# Patient Record
Sex: MALE | Race: WHITE
[De-identification: names, ages, dates, MRNs, and addresses within clinical notes are randomized per-mention and may not be internally consistent; named-entity substitution may affect disease eponyms.]

---

## 2019-11-04 ENCOUNTER — HOSPITAL ENCOUNTER (INPATIENT)
Dept: HOSPITAL 74 - YASAS | Age: 54
LOS: 4 days | Discharge: HOME | DRG: 773 | End: 2019-11-08
Attending: ALLERGY & IMMUNOLOGY | Admitting: ALLERGY & IMMUNOLOGY
Payer: COMMERCIAL

## 2019-11-04 VITALS — BODY MASS INDEX: 17.9 KG/M2

## 2019-11-04 DIAGNOSIS — F19.20: ICD-10-CM

## 2019-11-04 DIAGNOSIS — F10.230: ICD-10-CM

## 2019-11-04 DIAGNOSIS — F43.10: ICD-10-CM

## 2019-11-04 DIAGNOSIS — J20.9: ICD-10-CM

## 2019-11-04 DIAGNOSIS — F32.9: ICD-10-CM

## 2019-11-04 DIAGNOSIS — F19.24: ICD-10-CM

## 2019-11-04 DIAGNOSIS — G89.29: ICD-10-CM

## 2019-11-04 DIAGNOSIS — F17.210: ICD-10-CM

## 2019-11-04 DIAGNOSIS — J45.909: ICD-10-CM

## 2019-11-04 DIAGNOSIS — Z91.5: ICD-10-CM

## 2019-11-04 DIAGNOSIS — M54.5: ICD-10-CM

## 2019-11-04 DIAGNOSIS — M25.562: ICD-10-CM

## 2019-11-04 DIAGNOSIS — F12.20: ICD-10-CM

## 2019-11-04 DIAGNOSIS — F11.23: Primary | ICD-10-CM

## 2019-11-04 DIAGNOSIS — F19.282: ICD-10-CM

## 2019-11-04 DIAGNOSIS — Z88.0: ICD-10-CM

## 2019-11-04 PROCEDURE — HZ2ZZZZ DETOXIFICATION SERVICES FOR SUBSTANCE ABUSE TREATMENT: ICD-10-PCS | Performed by: ALLERGY & IMMUNOLOGY

## 2019-11-04 RX ADMIN — ALBUTEROL SULFATE SCH AMP: 2.5 SOLUTION RESPIRATORY (INHALATION) at 21:30

## 2019-11-04 RX ADMIN — NICOTINE SCH MG: 21 PATCH TRANSDERMAL at 18:24

## 2019-11-04 RX ADMIN — GUAIFENESIN SCH ML: 100 SOLUTION ORAL at 23:39

## 2019-11-04 RX ADMIN — GUAIFENESIN SCH ML: 100 SOLUTION ORAL at 18:24

## 2019-11-04 RX ADMIN — Medication SCH MG: at 21:31

## 2019-11-04 NOTE — HP
COWS





- Scale


Resting Pulse: 2= -120


Sweatin=Flushed/Facial Moisture


Restless Observation: 3= Extraneous Movement


Pupil Size: 0= Normal to Room Light


Bone or Joint Aches: 1= Mild Discomfort


Runny Nose/ Eye Tearin= Nasal Congestion


GI Upset > 30mins: 2= Nausea/Diarrhea


Tremor Observation: 1= Tremor Felt, Not Seen


Yawning Observation: 0= None


Anxiety or Irritability: 1=Feels Anxious/Irritable


Goose Flesh Skin: 3=Piloerection


COWS Score: 16





CIWA Score


Nausea/Vomitin-No Nausea/No Vomiting


Muscle Tremors: 2


Anxiety: 3


Agitation: 3


Paroxysmal Sweats: 3


Orientation: 0-Oriented


Tacttile Disturbances: 0-None


Auditory Disturbances: 0-None


Visual Disturbances: 0-None


Headache: 0-None Present


CIWA-Ar Total Score: 11





- Admission Criteria


[facility] Guidelines: Admission for Medically Managed Detox: 


Requires at least one of the followin. CIWA greater than 12


2. Seizures within the past 24 hours


3. Delirium tremens within the past 24 hours


4. Hallucinations within the past 24 hours


5. Acute intervention needed for co  occurring medical disorder


6. Acute intervention needed for co  occurring psychiatric disorder


7. Severe withdrawal that cannot be handled at a lower level of care (continued


    vomiting, continued diarrhea, abnormal vital signs) requiring intravenous


    medication and/or fluids


8. Pregnancy








Admitting History and Physical





- Primary Care Physician


PCP: Unknown





- Admission


Chief Complaint: To detox from alcohol, heroine and K2


History of Present Illness: 








Pt is a 55 yo M with PMHx of knee, back pain, unable to walk,possible asthma, 

currently withy cough, has night sweats, PTSD, Anxiety, major depression off 

meds- over 1 year after consulting doctor (remeron, depakote, abilify), pt 

lives on streets.  lease  from a room


Has been unemployed for 6 months, pt slipped and fell in 2019 and fractured 

L knee s/p 2 surgeries but unable to ambulate. Is requesting a walker.


Intermittently uses seroquel to sleep from the street





Pt used alcohol and K2 this am


Marijuana daily


Heroine last use yesterday


Cigarettes 1PPD for 39 years


Pt reports daily heroine withdrawals with night sweats





Alcohol-12


Heroine- 7


K2-50 yrs





Has done detox , clean for 13 years- heroine and alcohol, detoxed, rehab 

then AA meetings


Went back when wife was killed in [place] after a kidnap and failed rape, 

and he restarted using- 5 months ago


Never seized, syncopized


Pt reports shaking/tremors daily, sweats, hot and cold flushes, nausea, loose 

stools x2 today





Surgeries: 2 L, knee surgeries, tumor L UE at 5months





History Source: Patient


Limitations to Obtaining History: No Limitations





- Past Medical History


Pulmonary: Yes: Asthma





- Past Surgical History


Past Surgical History: Yes: Arthrosocopy (L knee x2 post fall)


Additional Past Surgical History: 





DANIEL surgery for childhood mass at 5 months





- Smoking History


Smoking history: Current every day smoker


Have you smoked in the past 12 months: Yes


Aproximately how many cigarettes per day: 20





- Alcohol/Substance Use


Hx Alcohol Use: Yes


Number of Drinks Daily: 1 (1$2 bottle of vodka)


History of Substance Use: reports: Heroin (3 bags)





- Social History


Usual Living Arrangement: Yes: Other (On street)


Do you think of yourself as: Straight/Heterosexual


ADL: Independent


History of Recent Travel: No


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 

substance abuse counselling, stopped 5 months ago





Admission [facility]


Allergies/Adverse Reactions: 


 Allergies











Allergy/AdvReac Type Severity Reaction Status Date / Time


 


Penicillins Allergy   Verified 19 15:44











Exam Limitations: No Limitations





- Ebola screening


Have you traveled outside of the country in the last 21 days: No (N)


Have you had contact with anyone from an Ebola affected area: No


Do you have a fever: No





- Review of Systems


Constitutional: Chills, Loss of Appetite, Weight Stable


EENT: reports: Nose Congestion


Respiratory: reports: Cough, Shortness of Breath, SOB at Rest


Cardiac: reports: Palpitations


GI: reports: Diarrhea


: reports: No Symptoms Reported


Musculoskeletal: reports: Back Pain, Joint Stiffness


Neuro: reports: No Symptoms reported


Endocrine: reports: No Symptoms Reported


Hematology: reports: No Symptoms Reported


Psychiatric: reports: No Sypmtoms Reported





Patient History





- Patient Medical History


Hx Anemia: No


Hx Asthma: Yes


Hx Chronic Obstructive Pulmonary Disease (COPD): No


Hx Cancer: No


Hx Cardiac Disorders: No


Hx Congestive Heart Failure: No


Hx Hypertension: No


Hx Hypercholesterolemia: No


Hx Pacemaker: No


HX Cerebrovascular Accident: No


Hx Seizures: No


Hx Dementia: No


Hx Diabetes: No


Hx Gastrointestinal Disorders: No


Hx Liver Disease: No


Hx Genitourinary Disorders: No


Hx Sexually Transmitted Disorders: No


Hx Renal Disease (ESRD): No


Hx Thyroid Disease: No


Hx Human Immunodeficiency Virus (HIV): No ( months ago, negative, refuses a new 

test)


Hx Hepatitis C: No


Hx Depression: Yes (Major depression not on meds)


Hx Suicide Attempt: Yes (Swallowed pills 10 years ago)


Hx Bipolar Disorder: No


Hx Schizophrenia: No





- Patient Surgical History


Hx Neurologic Surgery: No


Hx Cataract Extraction: No


Hx Cardiac Surgery: No


Hx Lung Surgery: No


Hx Breast Surgery: No


Hx Breast Biopsy: No


Hx Abdominal Surgery: No


Hx Appendectomy: No


Hx Cholecystectomy: No


Hx Genitourinary Surgery: No


Hx  Section: No


Hx Orthopedic Surgery: Yes (L knee x2)


Hx Hysterectomy: No


Anesthesia Reaction: No





- PPD History


Previous Implant?: Yes


Documented Results: Negative w/proof


Implanted On Prior [facility] Admission?: No


PPD to be Administered?: No





- Reproductive History


Patient is a Female of Child Bearing Age (11 -55 yrs old): No





- Smoking Cessation


Smoking history: Current every day smoker


Have you smoked in the past 12 months: Yes


Aproximately how many cigarettes per day: 20


Hx Chewing Tobacco Use: No


Initiated information on smoking cessation: Yes


'Breaking Loose' booklet given: 19





- Substances abused


  ** Alcohol


Substance route: Oral


Frequency: Daily


Amount used: 4 nips of vodka


Age of first use: 10


Date of last use: 19





  ** Heroin


Substance route: Inhalation


Frequency: Daily


Amount used: 1 bag


Age of first use: 7


Date of last use: 19





  ** K2/Spice


Substance route: Smoking


Frequency: Daily


Amount used: $30


Age of first use: 49


Date of last use: 17





Admission Physical Exam [facility]





- Vital Signs


Vital Signs: 


 Vital Signs - 24 hr











  19





  15:48


 


Temperature 96.3 F L


 


Pulse Rate 105 H


 


Respiratory 18





Rate 


 


Blood Pressure 167/108 H














- Physical


General Appearance: Yes: Disheveled, Cachetic, Tremorous, Anxious


HEENTM: Yes: EOMI, DINA.  No: Scleral Ictenus R, Thrush


Respiratory: Yes: Crackles, Rhonchi, Wheezing


Neck: Yes: Supple


Breast: Yes: Breast Exam Deferred


Cardiology: Yes: Tachycardia


Genitourinary: Yes: Within Normal Limits


Back: No: CVA Tenderness


Extremities: Yes: Tremors


Neurological: Yes: Fully Oriented, Motor Strength 5/5.  No: EOM Palsy, Facial 

Droop


Integumentary: Yes: Within Normal Limits





- Diagnostic


(1) Acute bronchitis


Current Visit: Yes   Status: Acute   





(2) Asthma


Current Visit: Yes   Status: Acute   





(3) Nicotine dependence


Current Visit: Yes   Status: Acute   





(4) Heroin dependence


Current Visit: Yes   Status: Acute   





(5) Heroin withdrawal


Current Visit: Yes   Status: Acute   





(6) Alcohol abuse


Current Visit: Yes   Status: Acute   





(7) Alcohol withdrawal


Current Visit: Yes   Status: Acute   





(8) Synthetic cannabinoid abuse


Current Visit: Yes   Status: Acute   





Cleared for Admission S





- Detox or Rehab


[facility] Level of Care: Medically Managed


Detox Regimen/Protocol: Clonidine/Librium, Librium


Claeared for Rehab Admission: No





Screened but not Admitted





- Documentation of Visit


Screened but not Admitted: No


Left Prior to Completion of Assessment: No


Insurance Authorization Denied: No


Patient Does Not Meet Criteria for Admission: No


Alternative Treatment/Shelter Info Provided: No





Breathalyzer





- Breathalyzer


Breathalyzer: 0





Vital Signs





- Vital Signs


Vital signs refused: No


Temperature: 96.3 F


Temperature source: Oral


Pulse Rate: 105


Respiratory Rate: 18


Blood Pressure: 167/108


Blood Pressure position: Sitting





- Height


Height: 1.6 m





- Weight


Weight: 45.813 kg





- BMI


Body Mass Index (BMI): 17.9





- Bowel Function


Bowel Movement: Yes





POC Urine pregnancy test





- Control


Pregnancy test control: No





Urine Drug Screen





- Control


Is test valid?: Yes





- Results


Urine drug screen results: BZO-Benzodiazepines





Inpatient Rehab Admission





- Rehab Decision to Admit


Inpatient rehab admission?: No

## 2019-11-05 LAB
ALBUMIN SERPL-MCNC: 3.5 G/DL (ref 3.4–5)
ALP SERPL-CCNC: 72 U/L (ref 45–117)
ALT SERPL-CCNC: 36 U/L (ref 13–61)
ANION GAP SERPL CALC-SCNC: 6 MMOL/L (ref 8–16)
APPEARANCE UR: CLEAR
AST SERPL-CCNC: 30 U/L (ref 15–37)
BACTERIA # UR AUTO: 2.9 /HPF
BILIRUB SERPL-MCNC: 0.4 MG/DL (ref 0.2–1)
BILIRUB UR STRIP.AUTO-MCNC: NEGATIVE MG/DL
BUN SERPL-MCNC: 12.9 MG/DL (ref 7–18)
CALCIUM SERPL-MCNC: 9.4 MG/DL (ref 8.5–10.1)
CASTS URNS QL MICRO: 4 /LPF (ref 0–8)
CHLORIDE SERPL-SCNC: 108 MMOL/L (ref 98–107)
CO2 SERPL-SCNC: 27 MMOL/L (ref 21–32)
COLOR UR: YELLOW
CREAT SERPL-MCNC: 0.9 MG/DL (ref 0.55–1.3)
DEPRECATED RDW RBC AUTO: 14.3 % (ref 11.9–15.9)
EPITH CASTS URNS QL MICRO: 1.2 /HPF
GLUCOSE SERPL-MCNC: 99 MG/DL (ref 74–106)
HCT VFR BLD CALC: 43.9 % (ref 35.4–49)
HGB BLD-MCNC: 14.9 GM/DL (ref 11.7–16.9)
KETONES UR QL STRIP: NEGATIVE
LEUKOCYTE ESTERASE UR QL STRIP.AUTO: NEGATIVE
MCH RBC QN AUTO: 31.4 PG (ref 25.7–33.7)
MCHC RBC AUTO-ENTMCNC: 33.9 G/DL (ref 32–35.9)
MCV RBC: 92.5 FL (ref 80–96)
NITRITE UR QL STRIP: NEGATIVE
PH UR: 5 [PH] (ref 5–8)
PLATELET # BLD AUTO: 247 K/MM3 (ref 134–434)
PMV BLD: 7.8 FL (ref 7.5–11.1)
POTASSIUM SERPLBLD-SCNC: 3.7 MMOL/L (ref 3.5–5.1)
PROT SERPL-MCNC: 6.9 G/DL (ref 6.4–8.2)
PROT UR QL STRIP: (no result)
PROT UR QL STRIP: NEGATIVE
RBC # BLD AUTO: 3 /HPF (ref 0–4)
RBC # BLD AUTO: 4.75 M/MM3 (ref 4–5.6)
SODIUM SERPL-SCNC: 141 MMOL/L (ref 136–145)
SP GR UR: 1.02 (ref 1.01–1.03)
UROBILINOGEN UR STRIP-MCNC: 0.2 MG/DL (ref 0.2–1)
WBC # BLD AUTO: 15.7 K/MM3 (ref 4–10)
WBC # UR AUTO: 4 /HPF (ref 0–5)

## 2019-11-05 RX ADMIN — GUAIFENESIN SCH ML: 100 SOLUTION ORAL at 19:00

## 2019-11-05 RX ADMIN — PREDNISONE SCH MG: 20 TABLET ORAL at 18:00

## 2019-11-05 RX ADMIN — ALBUTEROL SULFATE SCH: 2.5 SOLUTION RESPIRATORY (INHALATION) at 10:49

## 2019-11-05 RX ADMIN — GUAIFENESIN SCH: 100 SOLUTION ORAL at 23:54

## 2019-11-05 RX ADMIN — ALBUTEROL SULFATE SCH: 2.5 SOLUTION RESPIRATORY (INHALATION) at 18:01

## 2019-11-05 RX ADMIN — Medication SCH MG: at 21:29

## 2019-11-05 RX ADMIN — GUAIFENESIN SCH ML: 100 SOLUTION ORAL at 10:48

## 2019-11-05 RX ADMIN — NICOTINE SCH: 21 PATCH TRANSDERMAL at 10:49

## 2019-11-05 RX ADMIN — ALBUTEROL SULFATE SCH: 2.5 SOLUTION RESPIRATORY (INHALATION) at 15:19

## 2019-11-05 RX ADMIN — GUAIFENESIN SCH: 100 SOLUTION ORAL at 06:41

## 2019-11-05 RX ADMIN — ALBUTEROL SULFATE SCH: 2.5 SOLUTION RESPIRATORY (INHALATION) at 20:15

## 2019-11-05 RX ADMIN — Medication SCH TAB: at 10:49

## 2019-11-05 NOTE — PN
S CIWA





- CIWA Score


Nausea/Vomitin


Muscle Tremors: 2


Anxiety: 2


Agitation: 2


Paroxysmal Sweats: 1-Minimal Palms Moist


Orientation: 0-Oriented


Tacttile Disturbances: 1-Very Mild Itch/Numbness


Auditory Disturbances: 0-None


Visual Disturbances: 0-None


Headache: 1-Very Mild


CIWA-Ar Total Score: 11





B[facility] Progress Note (SOAP)


Subjective: 





alert,irritable,anxious,interrupted sleep,pain in the body,coughing


Objective: 





19 13:20


 Vital Signs











Temperature  97.5 F L  19 09:28


 


Pulse Rate  106 H  19 09:28


 


Respiratory Rate  18   19 09:28


 


Blood Pressure  118/70   19 09:28


 


O2 Sat by Pulse Oximetry (%)      








 Laboratory Last Values











WBC  15.7 K/mm3 (4.0-10.0)  H  19  08:00    


 


RBC  4.75 M/mm3 (4.00-5.60)   19  08:00    


 


Hgb  14.9 GM/dL (11.7-16.9)   19  08:00    


 


Hct  43.9 % (35.4-49)   19  08:00    


 


MCV  92.5 fl (80-96)   19  08:00    


 


MCH  31.4 pg (25.7-33.7)   19  08:00    


 


MCHC  33.9 g/dl (32.0-35.9)   19  08:00    


 


RDW  14.3 % (11.9-15.9)   19  08:00    


 


Plt Count  247 K/MM3 (134-434)   19  08:00    


 


MPV  7.8 fl (7.5-11.1)   19  08:00    


 


Sodium  141 mmol/L (136-145)   19  08:00    


 


Potassium  3.7 mmol/L (3.5-5.1)   19  08:00    


 


Chloride  108 mmol/L ()  H  19  08:00    


 


Carbon Dioxide  27 mmol/L (21-32)   19  08:00    


 


Anion Gap  6 MMOL/L (8-16)  L  19  08:00    


 


BUN  12.9 mg/dL (7-18)   19  08:00    


 


Creatinine  0.9 mg/dL (0.55-1.3)   19  08:00    


 


Est GFR (CKD-EPI)AfAm  110.27   19  08:00    


 


Est GFR (CKD-EPI)NonAf  95.14   19  08:00    


 


Random Glucose  99 mg/dL ()   19  08:00    


 


Calcium  9.4 mg/dL (8.5-10.1)   19  08:00    


 


Total Bilirubin  0.4 mg/dL (0.2-1)   19  08:00    


 


AST  30 U/L (15-37)   19  08:00    


 


ALT  36 U/L (13-61)   19  08:00    


 


Alkaline Phosphatase  72 U/L ()   19  08:00    


 


Total Protein  6.9 g/dl (6.4-8.2)   19  08:00    


 


Albumin  3.5 g/dl (3.4-5.0)   19  08:00    


 


Urine Color  Yellow   19  08:00    


 


Urine Appearance  Clear   19  08:00    


 


Urine pH  5.0  (5.0-8.0)   19  08:00    


 


Ur Specific Gravity  1.020  (1.010-1.035)   19  08:00    


 


Urine Protein  1+  (NEGATIVE)  H  19  08:00    


 


Urine Glucose (UA)  Negative  (NEGATIVE)   19  08:00    


 


Urine Ketones  Negative  (NEGATIVE)   19  08:00    


 


Urine Blood  Trace  (NEGATIVE)   19  08:00    


 


Urine Nitrite  Negative  (NEGATIVE)   19  08:00    


 


Urine Bilirubin  Negative  (NEGATIVE)   19  08:00    


 


Urine Urobilinogen  0.2 mg/dL (0.2-1.0)   19  08:00    


 


Ur Leukocyte Esterase  Negative  (NEGATIVE)   19  08:00    


 


Urine WBC (Auto)  4 /hpf (0-5)   19  08:00    


 


Urine RBC (Auto)  3 /hpf (0-4)   19  08:00    


 


Urine Casts (Auto)  4 /lpf (0-8)   19  08:00    


 


U Epithel Cells (Auto)  1.2 /HPF (0-5/HPF)   19  08:00    


 


Urine Bacteria (Auto)  2.9 /hpf (NEGATIVE)   19  08:00    











19 13:21


rpr pending


Assessment: 





19 13:22


withdrawal symptom


Plan: 





continue detox librium regimens,zithromax 500 mgs po now then 250 mgs po daily 

for 3 days for bronchitis,


albuterol in haler,on pred nosone 20 mg po daily

## 2019-11-05 NOTE — PN
Teaching Attending Note


Name of Resident: La Evans





ATTENDING PHYSICIAN STATEMENT





I saw and evaluated the patient.


I reviewed the resident's note and discussed the case with the resident.


I agree with the resident's findings and plan as documented.








SUBJECTIVE:


Agree with resident's subjective findings.





OBJECTIVE:


Agree with resident's objective findings.





ASSESSMENT AND PLAN:


Agree with admission criteria and plans.

## 2019-11-05 NOTE — CONSULT
[facility] Psychiatric Consult





- Data


Date of interview: 11/05/19


Admission source: [facility]


Identifying data: Mr Galvin is a 54 years old   male, father of 

2 children, homeless seeking detox treatment for alcohol, opioid, cannabis and 

k2


Substance Abuse History: Reports history of alcohol, heroin, marijuana and k2 

use. Refer to addiction counselor's summary for further information


Medical History: Significant for  knee/back pain and history of orthosurgery 

for fracture left knee  due to a fall in February 2019. Smokes cigarettes 1 ppd


Psychiatric History: Reports that his first psychiatric contact occured in 1998 

while in a rehab program at [facility]. He was diagnosed with PTSD and 

started on psychotropic medications. Over the years, he has been tried on 

several medications including Remeron, Abilify, Depakote, Zoloft, Alprazolam 

etc. Told writer that he last took medication 2 years ago. Reports that his 

adherence to [employer] care and medications were abysmal. Reports one previous 

suicidal attempt 10 years ago by overdose on pills. At present, reports feeling 

"melancholic" and sleeping poorly


Physical/Sexual Abuse/Trauma History: Denies history of abuse as a child and DV 

relationship as adult. Reports serving in the [employer] from 1985 to 1987. 

Reports that he was discharged  honorably


Additional Comment: Reports history of a few previous arrests including 2 

felony convictions on charges of drug sale. Told writer that he has an open 

case on charges of possession of k2





Mental Status Exam





- Mental Status Exam


Alert and Oriented to: Time, Place, Person


Cognitive Function: Fair


Patient Appearance: Unkempt


Mood: Depressed


Patient Behavior: Cooperative


Speech Pattern: Clear


Voice Loudness: Normal


Thought Process: Intact, Goal Oriented


Hallucinations: Denies


Suicidal Ideation: Denies


Homicidal Ideation: Denies


Insight/Judgement: Poor


Sleep: Poorly


Appetite: Fair


Muscle strength/Tone: Normal


Gait/Station: Normal





Psychiatric Findings





- Problem List (Axis 1, 2,3)


(1) PTSD (post-traumatic stress disorder)


Current Visit: Yes   Status: Chronic   





(2) Substance induced mood disorder


Current Visit: Yes   Status: Acute   





(3) Substance-induced sleep disorder


Current Visit: Yes   Status: Acute   





(4) Alcohol dependence, uncomplicated


Current Visit: Yes   Status: Acute   





(5) Opioid dependence


Current Visit: Yes   Status: Acute   





(6) Cannabis dependence


Current Visit: Yes   Status: Acute   





(7) Nicotine dependence


Current Visit: Yes   Status: Chronic   





(8) Acute bronchitis


Current Visit: Yes   Status: Acute   





(9) Knee pain, left


Current Visit: Yes   Status: Chronic   





(10) Back pain


Current Visit: Yes   Status: Chronic   





- Initial Treatment Plan


Initial Treatment Plan: 1) Start Melatonin 10 mg po HS prn for insomnia.  2) 

Continue inpatient detoxification

## 2019-11-05 NOTE — EKG
Test Reason : 

Blood Pressure : ***/*** mmHG

Vent. Rate : 101 BPM     Atrial Rate : 101 BPM

   P-R Int : 150 ms          QRS Dur : 096 ms

    QT Int : 372 ms       P-R-T Axes : 097 125 121 degrees

   QTc Int : 482 ms

 

*** SUSPECT ARM LEAD REVERSAL, INTERPRETATION ASSUMES NO REVERSAL

SINUS TACHYCARDIA

BIATRIAL ENLARGEMENT

LATERAL INFARCT , AGE UNDETERMINED

ABNORMAL ECG

NO PREVIOUS ECGS AVAILABLE

Confirmed by MD Eduardo, Guicho ([id number]) on 11/5/2019 9:14:21 AM

 

Referred By:             Confirmed By:Guicho Clark MD

## 2019-11-06 RX ADMIN — GUAIFENESIN SCH: 100 SOLUTION ORAL at 12:13

## 2019-11-06 RX ADMIN — Medication SCH MG: at 22:42

## 2019-11-06 RX ADMIN — ALBUTEROL SULFATE SCH: 2.5 SOLUTION RESPIRATORY (INHALATION) at 16:43

## 2019-11-06 RX ADMIN — ALBUTEROL SULFATE SCH: 2.5 SOLUTION RESPIRATORY (INHALATION) at 12:46

## 2019-11-06 RX ADMIN — NICOTINE SCH: 21 PATCH TRANSDERMAL at 10:05

## 2019-11-06 RX ADMIN — Medication SCH TAB: at 10:03

## 2019-11-06 RX ADMIN — PREDNISONE SCH MG: 20 TABLET ORAL at 10:04

## 2019-11-06 RX ADMIN — ALBUTEROL SULFATE SCH: 2.5 SOLUTION RESPIRATORY (INHALATION) at 08:14

## 2019-11-06 RX ADMIN — AZITHROMYCIN SCH MG: 250 TABLET, FILM COATED ORAL at 10:04

## 2019-11-06 RX ADMIN — GUAIFENESIN SCH ML: 100 SOLUTION ORAL at 06:08

## 2019-11-06 RX ADMIN — ALBUTEROL SULFATE SCH: 2.5 SOLUTION RESPIRATORY (INHALATION) at 20:43

## 2019-11-06 NOTE — EKG
Test Reason : 

Blood Pressure : ***/*** mmHG

Vent. Rate : 101 BPM     Atrial Rate : 101 BPM

   P-R Int : 114 ms          QRS Dur : 098 ms

    QT Int : 350 ms       P-R-T Axes : 080 045 042 degrees

   QTc Int : 453 ms

 

SINUS TACHYCARDIA

BIATRIAL ENLARGEMENT

PULMONARY DISEASE PATTERN

NONSPECIFIC ST ABNORMALITY

ABNORMAL ECG

WHEN COMPARED WITH ECG OF 04-NOV-2019 18:59,

QRS AXIS SHIFTED LEFT

CRITERIA FOR LATERAL INFARCT ARE NO LONGER PRESENT

Confirmed by OMAR JEFFERY, ELIA ([id number]) on 11/6/2019 10:39:24 AM

 

Referred By:             Confirmed By:ELIA NELSON MD

## 2019-11-06 NOTE — PN
[facility] Progress Note


Note: 


Pt. assessed on the unit s/p unwitnessed fall. Pt. reported he fell asleep on a 

chair after watching TV.  He denied hitting his head or sustaining any 

injuries.  Pt.  is alert and oriented x 3; self-directing. Full ROM noted. He 

denied pain or discomfort; no bruises noted. 








Afterwards, writer reviewed the video camera of the client falling with security

; pt. seemingly did not hit his head.    





Plan: fall protocol 2 initiated. 


Reinforced fall precautions

## 2019-11-06 NOTE — PN
S CIWA





- CIWA Score


Nausea/Vomitin-Mild Nausea/No Vomiting


Muscle Tremors: 1-None Visible, but Felt


Anxiety: 2


Agitation: 2


Paroxysmal Sweats: 1-Minimal Palms Moist


Orientation: 0-Oriented


Tacttile Disturbances: 1-Very Mild Itch/Numbness


Auditory Disturbances: 0-None


Visual Disturbances: 0-None


Headache: 1-Very Mild


CIWA-Ar Total Score: 9





B[facility] Progress Note (SOAP)


Subjective: 





alert,irritable,anxious,interrupted sleep,pain in the body,


Objective: 





19 14:22


 Vital Signs











Temperature  97.3 F L  19 13:22


 


Pulse Rate  97 H  19 13:22


 


Respiratory Rate  18   19 13:22


 


Blood Pressure  122/92   19 13:22


 


O2 Sat by Pulse Oximetry (%)      








 Laboratory Last Values











WBC  15.7 K/mm3 (4.0-10.0)  H  19  08:00    


 


RBC  4.75 M/mm3 (4.00-5.60)   19  08:00    


 


Hgb  14.9 GM/dL (11.7-16.9)   19  08:00    


 


Hct  43.9 % (35.4-49)   19  08:00    


 


MCV  92.5 fl (80-96)   19  08:00    


 


MCH  31.4 pg (25.7-33.7)   19  08:00    


 


MCHC  33.9 g/dl (32.0-35.9)   19  08:00    


 


RDW  14.3 % (11.9-15.9)   19  08:00    


 


Plt Count  247 K/MM3 (134-434)   19  08:00    


 


MPV  7.8 fl (7.5-11.1)   19  08:00    


 


Sodium  141 mmol/L (136-145)   19  08:00    


 


Potassium  3.7 mmol/L (3.5-5.1)   19  08:00    


 


Chloride  108 mmol/L ()  H  19  08:00    


 


Carbon Dioxide  27 mmol/L (21-32)   19  08:00    


 


Anion Gap  6 MMOL/L (8-16)  L  19  08:00    


 


BUN  12.9 mg/dL (7-18)   19  08:00    


 


Creatinine  0.9 mg/dL (0.55-1.3)   19  08:00    


 


Est GFR (CKD-EPI)AfAm  110.27   19  08:00    


 


Est GFR (CKD-EPI)NonAf  95.14   19  08:00    


 


Random Glucose  99 mg/dL ()   19  08:00    


 


Calcium  9.4 mg/dL (8.5-10.1)   19  08:00    


 


Total Bilirubin  0.4 mg/dL (0.2-1)   19  08:00    


 


AST  30 U/L (15-37)   19  08:00    


 


ALT  36 U/L (13-61)   19  08:00    


 


Alkaline Phosphatase  72 U/L ()   19  08:00    


 


Total Protein  6.9 g/dl (6.4-8.2)   19  08:00    


 


Albumin  3.5 g/dl (3.4-5.0)   19  08:00    


 


Urine Color  Yellow   19  08:00    


 


Urine Appearance  Clear   19  08:00    


 


Urine pH  5.0  (5.0-8.0)   19  08:00    


 


Ur Specific Gravity  1.020  (1.010-1.035)   19  08:00    


 


Urine Protein  1+  (NEGATIVE)  H  19  08:00    


 


Urine Glucose (UA)  Negative  (NEGATIVE)   19  08:00    


 


Urine Ketones  Negative  (NEGATIVE)   19  08:00    


 


Urine Blood  Trace  (NEGATIVE)   19  08:00    


 


Urine Nitrite  Negative  (NEGATIVE)   19  08:00    


 


Urine Bilirubin  Negative  (NEGATIVE)   19  08:00    


 


Urine Urobilinogen  0.2 mg/dL (0.2-1.0)   19  08:00    


 


Ur Leukocyte Esterase  Negative  (NEGATIVE)   19  08:00    


 


Urine WBC (Auto)  4 /hpf (0-5)   19  08:00    


 


Urine RBC (Auto)  3 /hpf (0-4)   19  08:00    


 


Urine Casts (Auto)  4 /lpf (0-8)   19  08:00    


 


U Epithel Cells (Auto)  1.2 /HPF (0-5/HPF)   19  08:00    


 


Urine Bacteria (Auto)  2.9 /hpf (NEGATIVE)   19  08:00    


 


RPR Titer  Nonreactive  (NONREACTIVE)   19  08:00    











Assessment: 





19 14:22


withdrawal symptom


Plan: 





continue detox lirium regimen,chest x ray today,on zithromax for acute 

bronchitis

## 2019-11-07 RX ADMIN — ALBUTEROL SULFATE SCH: 2.5 SOLUTION RESPIRATORY (INHALATION) at 12:17

## 2019-11-07 RX ADMIN — Medication SCH MG: at 22:26

## 2019-11-07 RX ADMIN — PREDNISONE SCH MG: 20 TABLET ORAL at 10:48

## 2019-11-07 RX ADMIN — ALBUTEROL SULFATE SCH AMP: 2.5 SOLUTION RESPIRATORY (INHALATION) at 08:29

## 2019-11-07 RX ADMIN — METHOCARBAMOL PRN MG: 500 TABLET ORAL at 12:18

## 2019-11-07 RX ADMIN — AZITHROMYCIN SCH MG: 250 TABLET, FILM COATED ORAL at 10:48

## 2019-11-07 RX ADMIN — ALBUTEROL SULFATE SCH: 2.5 SOLUTION RESPIRATORY (INHALATION) at 16:01

## 2019-11-07 RX ADMIN — GUAIFENESIN SCH MG: 600 TABLET, EXTENDED RELEASE ORAL at 12:18

## 2019-11-07 RX ADMIN — ALBUTEROL SULFATE SCH: 2.5 SOLUTION RESPIRATORY (INHALATION) at 20:52

## 2019-11-07 RX ADMIN — Medication SCH TAB: at 10:47

## 2019-11-07 RX ADMIN — GUAIFENESIN SCH MG: 600 TABLET, EXTENDED RELEASE ORAL at 21:00

## 2019-11-07 RX ADMIN — NICOTINE SCH: 21 PATCH TRANSDERMAL at 10:49

## 2019-11-07 NOTE — PN
S CIWA





- CIWA Score


Nausea/Vomitin-No Nausea/No Vomiting


Muscle Tremors: 2


Anxiety: 1-Mildly Anxious


Agitation: 0-Normal Activity


Paroxysmal Sweats: No Perspiration


Orientation: 0-Oriented


Tacttile Disturbances: 0-None


Auditory Disturbances: 0-None


Visual Disturbances: 0-None


Headache: 0-None Present


CIWA-Ar Total Score: 3





[facility] Progress Note (SOAP)


Subjective: 





knee pain


sweats





Objective: 





19 11:32


 Vital Signs











Temperature  97.0 F L  19 10:25


 


Pulse Rate  91 H  19 10:25


 


Respiratory Rate  18   19 10:25


 


Blood Pressure  119/67   19 10:25


 


O2 Sat by Pulse Oximetry (%)      








aaox3


ambulating


no acute distress


Assessment: 





19 11:32


mild withdrawals


Plan: 





continue detox


cane ordered for ambulating


motrin 800mg tid prn


tyelnol prn


roboxin prn


lidocaine patch 


d/c in am

## 2019-11-08 VITALS — TEMPERATURE: 97.5 F | DIASTOLIC BLOOD PRESSURE: 70 MMHG | SYSTOLIC BLOOD PRESSURE: 126 MMHG | HEART RATE: 92 BPM

## 2019-11-08 RX ADMIN — NICOTINE SCH: 21 PATCH TRANSDERMAL at 09:16

## 2019-11-08 RX ADMIN — Medication SCH TAB: at 09:15

## 2019-11-08 RX ADMIN — METHOCARBAMOL PRN MG: 500 TABLET ORAL at 00:52

## 2019-11-08 RX ADMIN — GUAIFENESIN SCH MG: 600 TABLET, EXTENDED RELEASE ORAL at 09:15

## 2019-11-08 RX ADMIN — AZITHROMYCIN SCH MG: 250 TABLET, FILM COATED ORAL at 09:16

## 2019-11-08 RX ADMIN — PREDNISONE SCH MG: 20 TABLET ORAL at 09:15

## 2019-11-08 NOTE — DS
[facility] Detox Discharge Summary


Admission Date: 


11/04/19





Discharge Date: 11/08/19





- History


Present History: Alcohol Dependence, Cannabis Dependence, Opioid Dependence





- Physical Exam Results


Vital Signs: 


 Vital Signs











Temperature  98.1 F   11/07/19 21:21


 


Pulse Rate  84   11/07/19 21:21


 


Respiratory Rate  18   11/07/19 21:21


 


Blood Pressure  114/82   11/07/19 21:21


 


O2 Sat by Pulse Oximetry (%)  91 L  11/07/19 08:59











Pertinent Admission Physical Exam Findings: 





pt arrived in withdrawals


 Vital Signs











Temperature  98.1 F   11/07/19 21:21


 


Pulse Rate  84   11/07/19 21:21


 


Respiratory Rate  18   11/07/19 21:21


 


Blood Pressure  114/82   11/07/19 21:21


 


O2 Sat by Pulse Oximetry (%)  91 L  11/07/19 08:59








 Laboratory Tests











  11/05/19 11/05/19 11/05/19





  08:00 08:00 08:00


 


WBC  15.7 H  


 


RBC  4.75  


 


Hgb  14.9  


 


Hct  43.9  


 


MCV  92.5  


 


MCH  31.4  


 


MCHC  33.9  


 


RDW  14.3  


 


Plt Count  247  


 


MPV  7.8  


 


Sodium   141 


 


Potassium   3.7 


 


Chloride   108 H 


 


Carbon Dioxide   27 


 


Anion Gap   6 L 


 


BUN   12.9 


 


Creatinine   0.9 


 


Est GFR (CKD-EPI)AfAm   110.27 


 


Est GFR (CKD-EPI)NonAf   95.14 


 


Random Glucose   99 


 


Calcium   9.4 


 


Total Bilirubin   0.4 


 


AST   30 


 


ALT   36 


 


Alkaline Phosphatase   72 


 


Total Protein   6.9 


 


Albumin   3.5 


 


Urine Color   


 


Urine Appearance   


 


Urine pH   


 


Ur Specific Gravity   


 


Urine Protein   


 


Urine Glucose (UA)   


 


Urine Ketones   


 


Urine Blood   


 


Urine Nitrite   


 


Urine Bilirubin   


 


Urine Urobilinogen   


 


Ur Leukocyte Esterase   


 


Urine WBC (Auto)   


 


Urine RBC (Auto)   


 


Urine Casts (Auto)   


 


U Epithel Cells (Auto)   


 


Urine Bacteria (Auto)   


 


RPR Titer    Nonreactive














  11/05/19





  08:00


 


WBC 


 


RBC 


 


Hgb 


 


Hct 


 


MCV 


 


MCH 


 


MCHC 


 


RDW 


 


Plt Count 


 


MPV 


 


Sodium 


 


Potassium 


 


Chloride 


 


Carbon Dioxide 


 


Anion Gap 


 


BUN 


 


Creatinine 


 


Est GFR (CKD-EPI)AfAm 


 


Est GFR (CKD-EPI)NonAf 


 


Random Glucose 


 


Calcium 


 


Total Bilirubin 


 


AST 


 


ALT 


 


Alkaline Phosphatase 


 


Total Protein 


 


Albumin 


 


Urine Color  Yellow


 


Urine Appearance  Clear


 


Urine pH  5.0


 


Ur Specific Gravity  1.020


 


Urine Protein  1+ H


 


Urine Glucose (UA)  Negative


 


Urine Ketones  Negative


 


Urine Blood  Trace


 


Urine Nitrite  Negative


 


Urine Bilirubin  Negative


 


Urine Urobilinogen  0.2


 


Ur Leukocyte Esterase  Negative


 


Urine WBC (Auto)  4


 


Urine RBC (Auto)  3


 


Urine Casts (Auto)  4


 


U Epithel Cells (Auto)  1.2


 


Urine Bacteria (Auto)  2.9


 


RPR Titer 








today pt is aaox3


ambulating


no acute distress


no s/s of withdrawals





- Treatment


Hospital Course: Detox Protocol Followed, Detoxed Safely, Responded well, 

Discharged Condition Good, Rehab Referral Accepted


Patient has Accepted a Rehab Referral to: pt referred to inpatient rehab





- Medication


Discharge Medications: 


Ambulatory Orders





Albuterol Sulfate Inhaler - [Ventolin Hfa Inhaler -] 1 - 2 inh PO PRN PRN 11/04/ 19 


Prednisone [Deltasone] 20 mg PO DAILY 11/04/19 











- Diagnosis


(1) Acute bronchitis


Current Visit: Yes   Status: Acute   





(2) Alcohol dependence, uncomplicated


Current Visit: Yes   Status: Acute   





(3) Asthma


Current Visit: Yes   Status: Acute   


Qualifiers: 


   Asthma severity: mild   Asthma complication type: unspecified 





(4) Cannabis dependence


Current Visit: Yes   Status: Chronic   





(5) Heroin withdrawal


Current Visit: Yes   Status: Acute   





(6) Opioid dependence


Current Visit: Yes   Status: Chronic   


Qualifiers: 


   Substance use status: uncomplicated   Qualified Code(s): F11.20 - Opioid 

dependence, uncomplicated   





(7) Substance induced mood disorder


Current Visit: Yes   Status: Acute   





(8) Substance-induced sleep disorder


Current Visit: Yes   Status: Acute   





(9) Back pain


Current Visit: Yes   Status: Chronic   


Qualifiers: 


   Back pain location: low back pain 





(10) Knee pain, left


Current Visit: Yes   Status: Chronic   





(11) Nicotine dependence


Current Visit: Yes   Status: Chronic   


Qualifiers: 


   Nicotine product type: cigarettes   Substance use status: uncomplicated   

Qualified Code(s): F17.210 - Nicotine dependence, cigarettes, uncomplicated   





(12) PTSD (post-traumatic stress disorder)


Current Visit: Yes   Status: Chronic   





- AMA


Did Patient Leave Against Medical Advice: No

## 2019-12-11 ENCOUNTER — APPOINTMENT (OUTPATIENT)
Dept: PHYSICAL MEDICINE AND REHAB | Facility: CLINIC | Age: 54
End: 2019-12-11
Payer: MEDICAID

## 2019-12-11 VITALS
BODY MASS INDEX: 21.26 KG/M2 | WEIGHT: 120 LBS | HEIGHT: 63 IN | HEART RATE: 81 BPM | DIASTOLIC BLOOD PRESSURE: 78 MMHG | OXYGEN SATURATION: 96 % | SYSTOLIC BLOOD PRESSURE: 124 MMHG

## 2019-12-11 DIAGNOSIS — F19.90 OTHER PSYCHOACTIVE SUBSTANCE USE, UNSPECIFIED, UNCOMPLICATED: ICD-10-CM

## 2019-12-11 DIAGNOSIS — Z78.9 OTHER SPECIFIED HEALTH STATUS: ICD-10-CM

## 2019-12-11 DIAGNOSIS — Z98.890 OTHER SPECIFIED POSTPROCEDURAL STATES: ICD-10-CM

## 2019-12-11 PROBLEM — Z00.00 ENCOUNTER FOR PREVENTIVE HEALTH EXAMINATION: Status: ACTIVE | Noted: 2019-12-11

## 2019-12-11 PROCEDURE — 99203 OFFICE O/P NEW LOW 30 MIN: CPT

## 2019-12-11 RX ORDER — LIDOCAINE 5% 700 MG/1
PATCH TOPICAL
Refills: 0 | Status: ACTIVE | COMMUNITY

## 2019-12-11 RX ORDER — ESCITALOPRAM OXALATE 5 MG/1
TABLET, FILM COATED ORAL
Refills: 0 | Status: ACTIVE | COMMUNITY

## 2019-12-11 NOTE — ASSESSMENT
[FreeTextEntry1] : Impression:\par 1. S/P Knee Surgery\par \par Plan: Review of the history and physical examination the patient is status post knee surgery due to trauma, however brings no documentation or imaging for further detail or review. The knee is diffusely tender to touch and the patient is guarding ROM. The patient states he is only requesting a Rollator Walker and plans to follow up with his knee surgeon for further care. I recommended the patient continue with his HEP as prescribed by his physical therapist. He is currently using OTC Lidocaine patches for pain. I have provided the patient with a referral for a walker. The patient expressed verbal understanding and is in agreement with the plan of care. All of the patient's questions and concerns were addressed during today's visit.

## 2019-12-11 NOTE — PHYSICAL EXAM
[FreeTextEntry1] : GEN:  NAD, AAOx3.\par HEENT:  NCAT. EOMI. Anicteric sclerae, no discharge.\par PSYCH:  Normal mood and affect. Responds appropriately to commands.\par CV:  DP and PT pulses 2+ bilaterally, no edema.\par INSPECTION:  No effusion, discoloration. Well healed midline surgical scar. \par GAIT: (+) antalgic.\par Lumbosacral AROM: within normal limits.\par Hip and ankle PROM: Full and pain free.\par Knee AROM: Pain limited, lacking full Ext and Flex>90 due to guarding.\par PALPATION:  No effusion, warmth or crepitus. (+) Diffuse TTP throughout the Left knee. \par LYMPH:  No popliteal LAD or lymphedema.\par REFLEXES:  2+ symmetric bilateral knee and ankles.\par SENSATION:  Normal to light touch in the bilateral lower extremities.\par MOTOR:  5/5 in all key muscle groups of the bilateral lower limbs. No spasticity, tremor, atrophy or contractures noted.\par SPECIAL:  Deferred. Patient refused to participate due to pain. No joint laxity noted on varus/valgus stress or lachman.

## 2019-12-11 NOTE — HISTORY OF PRESENT ILLNESS
[FreeTextEntry1] : Mr. HARVINDER JOINER is a very pleasant 54 year male who comes in for initial evaluation of knee pain that has been ongoing for six months, the patient sustained trauma and underwent 2 reconstructive surgeries July/August 2019 followed by PT and has been in pain since. The patient brings no records or imaging with him today. Currently the pain is located in the left knee constant in nature and described as excruciating pain. The pain is rated as 10/10 during today's visit, and ranges from 9-10/10. The patient's symptoms are aggravated by coldness and alleviated by nothing. The patient denies any night pain, numbness/tingling, weakness, or bowel/bladder dysfunction. The patient has no other complaints at this time.

## 2019-12-11 NOTE — REVIEW OF SYSTEMS
[Patient Intake Form Reviewed] : Patient intake form was reviewed [FreeTextEntry1] : Constitutional: no chills, not feeling tired and no fever. \par Eyes: no discharge, no pain and no redness. \par ENT: no nasal discharge, no nosebleeds and no sore throat. \par Cardiovascular: no chest pain, no palpitations and the heart rate was not fast. \par Respiratory: no shortness of breath, no cough and no wheezing. \par Gastrointestinal: no abdominal pain, no diarrhea, no vomiting and no melena. \par Genitourinary: no pelvic pain, no dysuria, no abnormal urethral discharge and no frequency. \par Integumentary: no skin lesions and no change in a mole. \par Neurological: no dizziness, no fainting and no convulsions. \par Endocrine: no deepening of the voice and no hot flashes. \par Hematologic/Lymphatic: does not bleed easily, no swollen glands and no cervical lymphadenopathy. \par Musculoskeletal: as per HPI, otherwise denies additional joint pain, effusions, stiffness.\par All other review of systems are negative except as noted.\par

## 2021-04-26 ENCOUNTER — EMERGENCY (EMERGENCY)
Facility: HOSPITAL | Age: 56
LOS: 1 days | Discharge: LEFT BEFORE DISCHARGE | End: 2021-04-26
Admitting: EMERGENCY MEDICINE
Payer: SELF-PAY

## 2021-04-26 VITALS
SYSTOLIC BLOOD PRESSURE: 135 MMHG | OXYGEN SATURATION: 96 % | DIASTOLIC BLOOD PRESSURE: 94 MMHG | RESPIRATION RATE: 18 BRPM | HEART RATE: 87 BPM

## 2021-04-26 DIAGNOSIS — R55 SYNCOPE AND COLLAPSE: ICD-10-CM

## 2021-04-26 DIAGNOSIS — Z53.21 PROCEDURE AND TREATMENT NOT CARRIED OUT DUE TO PATIENT LEAVING PRIOR TO BEING SEEN BY HEALTH CARE PROVIDER: ICD-10-CM

## 2021-04-26 PROCEDURE — 99283 EMERGENCY DEPT VISIT LOW MDM: CPT

## 2021-04-26 PROCEDURE — L9991: CPT

## 2021-04-26 NOTE — ED ADULT TRIAGE NOTE - EXPLANATION OF PATIENT'S REASON FOR LEAVING
pt states he has a follow up appointment with his cardiologist tomorrow and he states he does not want to stay to be evaluated in the [facility] ED; pt ambulatory and A&Ox4

## 2021-04-26 NOTE — ED ADULT TRIAGE NOTE - CHIEF COMPLAINT QUOTE
pt reports hx of multiple syncopal episodes within last week including this AM; BGL found to be 62 with EMS , given D50 and rechecked with ; pt currently A&Ox4, NAD and requesting to leave, states he is aware of risks associated with leaving including life-threatening complications and not undergoing medical evaluation; pt verbalized understanding and still requesting to leave; refused to complete triage questions and assessment including BGL and EKG; PIV removed that had been placed by EMS

## 2022-01-07 ENCOUNTER — TRANSCRIPTION ENCOUNTER (OUTPATIENT)
Age: 57
End: 2022-01-07

## 2022-05-29 ENCOUNTER — EMERGENCY (EMERGENCY)
Facility: HOSPITAL | Age: 57
LOS: 1 days | Discharge: LEFT BEFORE DISCHARGE | End: 2022-05-29
Attending: EMERGENCY MEDICINE | Admitting: EMERGENCY MEDICINE
Payer: MEDICAID

## 2022-05-29 VITALS
OXYGEN SATURATION: 99 % | DIASTOLIC BLOOD PRESSURE: 79 MMHG | SYSTOLIC BLOOD PRESSURE: 107 MMHG | RESPIRATION RATE: 16 BRPM | HEART RATE: 106 BPM | TEMPERATURE: 98 F | WEIGHT: 119.93 LBS

## 2022-05-29 PROCEDURE — L9991: CPT

## 2022-05-29 RX ORDER — ONDANSETRON 8 MG/1
4 TABLET, FILM COATED ORAL ONCE
Refills: 0 | Status: DISCONTINUED | OUTPATIENT
Start: 2022-05-29 | End: 2022-06-02

## 2022-05-31 DIAGNOSIS — Z88.0 ALLERGY STATUS TO PENICILLIN: ICD-10-CM

## 2022-05-31 DIAGNOSIS — R11.0 NAUSEA: ICD-10-CM

## 2022-05-31 DIAGNOSIS — Z53.21 PROCEDURE AND TREATMENT NOT CARRIED OUT DUE TO PATIENT LEAVING PRIOR TO BEING SEEN BY HEALTH CARE PROVIDER: ICD-10-CM

## 2022-07-14 ENCOUNTER — EMERGENCY (EMERGENCY)
Facility: HOSPITAL | Age: 57
LOS: 1 days | Discharge: HOME | End: 2022-07-14
Attending: EMERGENCY MEDICINE | Admitting: EMERGENCY MEDICINE

## 2022-07-14 ENCOUNTER — INPATIENT (INPATIENT)
Facility: HOSPITAL | Age: 57
LOS: 3 days | Discharge: HOME | DRG: 812 | End: 2022-07-18
Attending: GENERAL ACUTE CARE HOSPITAL | Admitting: STUDENT IN AN ORGANIZED HEALTH CARE EDUCATION/TRAINING PROGRAM
Payer: MEDICAID

## 2022-07-14 VITALS
DIASTOLIC BLOOD PRESSURE: 60 MMHG | WEIGHT: 145.06 LBS | OXYGEN SATURATION: 100 % | SYSTOLIC BLOOD PRESSURE: 90 MMHG | RESPIRATION RATE: 17 BRPM | HEART RATE: 87 BPM | TEMPERATURE: 99 F

## 2022-07-14 VITALS
OXYGEN SATURATION: 97 % | SYSTOLIC BLOOD PRESSURE: 106 MMHG | RESPIRATION RATE: 20 BRPM | SYSTOLIC BLOOD PRESSURE: 106 MMHG | TEMPERATURE: 98 F | HEART RATE: 86 BPM | DIASTOLIC BLOOD PRESSURE: 68 MMHG | DIASTOLIC BLOOD PRESSURE: 68 MMHG | TEMPERATURE: 98 F | HEART RATE: 86 BPM

## 2022-07-14 DIAGNOSIS — D64.9 ANEMIA, UNSPECIFIED: ICD-10-CM

## 2022-07-14 DIAGNOSIS — F19.19 OTHER PSYCHOACTIVE SUBSTANCE ABUSE WITH UNSPECIFIED PSYCHOACTIVE SUBSTANCE-INDUCED DISORDER: ICD-10-CM

## 2022-07-14 DIAGNOSIS — R55 SYNCOPE AND COLLAPSE: ICD-10-CM

## 2022-07-14 DIAGNOSIS — Z88.0 ALLERGY STATUS TO PENICILLIN: ICD-10-CM

## 2022-07-14 DIAGNOSIS — F43.10 POST-TRAUMATIC STRESS DISORDER, UNSPECIFIED: ICD-10-CM

## 2022-07-14 DIAGNOSIS — Z20.822 CONTACT WITH AND (SUSPECTED) EXPOSURE TO COVID-19: ICD-10-CM

## 2022-07-14 LAB
ALBUMIN SERPL ELPH-MCNC: 3 G/DL — LOW (ref 3.4–5)
ALP SERPL-CCNC: 63 U/L — SIGNIFICANT CHANGE UP (ref 40–120)
ALT FLD-CCNC: 25 U/L — SIGNIFICANT CHANGE UP (ref 12–42)
AMPHET UR-MCNC: NEGATIVE — SIGNIFICANT CHANGE UP
ANION GAP SERPL CALC-SCNC: 5 MMOL/L — LOW (ref 9–16)
ANISOCYTOSIS BLD QL: SIGNIFICANT CHANGE UP
APPEARANCE UR: CLEAR — SIGNIFICANT CHANGE UP
AST SERPL-CCNC: 14 U/L — LOW (ref 15–37)
BARBITURATES UR SCN-MCNC: NEGATIVE — SIGNIFICANT CHANGE UP
BASOPHILS # BLD AUTO: 0.02 K/UL — SIGNIFICANT CHANGE UP (ref 0–0.2)
BASOPHILS NFR BLD AUTO: 0.2 % — SIGNIFICANT CHANGE UP (ref 0–2)
BENZODIAZ UR-MCNC: NEGATIVE — SIGNIFICANT CHANGE UP
BILIRUB SERPL-MCNC: 0.2 MG/DL — SIGNIFICANT CHANGE UP (ref 0.2–1.2)
BILIRUB UR-MCNC: NEGATIVE — SIGNIFICANT CHANGE UP
BUN SERPL-MCNC: 18 MG/DL — SIGNIFICANT CHANGE UP (ref 7–23)
CALCIUM SERPL-MCNC: 8.9 MG/DL — SIGNIFICANT CHANGE UP (ref 8.5–10.5)
CHLORIDE SERPL-SCNC: 107 MMOL/L — SIGNIFICANT CHANGE UP (ref 96–108)
CO2 SERPL-SCNC: 32 MMOL/L — HIGH (ref 22–31)
COCAINE METAB.OTHER UR-MCNC: NEGATIVE — SIGNIFICANT CHANGE UP
COLOR SPEC: YELLOW — SIGNIFICANT CHANGE UP
CREAT SERPL-MCNC: 0.87 MG/DL — SIGNIFICANT CHANGE UP (ref 0.5–1.3)
DIFF PNL FLD: NEGATIVE — SIGNIFICANT CHANGE UP
EGFR: 99 ML/MIN/1.73M2 — SIGNIFICANT CHANGE UP
EOSINOPHIL # BLD AUTO: 0.06 K/UL — SIGNIFICANT CHANGE UP (ref 0–0.5)
EOSINOPHIL NFR BLD AUTO: 0.7 % — SIGNIFICANT CHANGE UP (ref 0–6)
ETHANOL SERPL-MCNC: <3 MG/DL — SIGNIFICANT CHANGE UP
GLUCOSE SERPL-MCNC: 115 MG/DL — HIGH (ref 70–99)
GLUCOSE UR QL: NEGATIVE — SIGNIFICANT CHANGE UP
HCT VFR BLD CALC: 18.1 % — CRITICAL LOW (ref 39–50)
HGB BLD-MCNC: 5.6 G/DL — CRITICAL LOW (ref 13–17)
HYPOCHROMIA BLD QL: SIGNIFICANT CHANGE UP
IMM GRANULOCYTES NFR BLD AUTO: 0.4 % — SIGNIFICANT CHANGE UP (ref 0–1.5)
KETONES UR-MCNC: NEGATIVE — SIGNIFICANT CHANGE UP
LEUKOCYTE ESTERASE UR-ACNC: NEGATIVE — SIGNIFICANT CHANGE UP
LYMPHOCYTES # BLD AUTO: 1.27 K/UL — SIGNIFICANT CHANGE UP (ref 1–3.3)
LYMPHOCYTES # BLD AUTO: 14.2 % — SIGNIFICANT CHANGE UP (ref 13–44)
MACROCYTES BLD QL: SIGNIFICANT CHANGE UP
MAGNESIUM SERPL-MCNC: 2.4 MG/DL — SIGNIFICANT CHANGE UP (ref 1.6–2.6)
MANUAL SMEAR VERIFICATION: SIGNIFICANT CHANGE UP
MCHC RBC-ENTMCNC: 28.1 PG — SIGNIFICANT CHANGE UP (ref 27–34)
MCHC RBC-ENTMCNC: 30.9 GM/DL — LOW (ref 32–36)
MCV RBC AUTO: 91 FL — SIGNIFICANT CHANGE UP (ref 80–100)
METHADONE UR-MCNC: POSITIVE
MONOCYTES # BLD AUTO: 0.66 K/UL — SIGNIFICANT CHANGE UP (ref 0–0.9)
MONOCYTES NFR BLD AUTO: 7.4 % — SIGNIFICANT CHANGE UP (ref 2–14)
NEUTROPHILS # BLD AUTO: 6.9 K/UL — SIGNIFICANT CHANGE UP (ref 1.8–7.4)
NEUTROPHILS NFR BLD AUTO: 77.1 % — HIGH (ref 43–77)
NEUTS HYPERSEG # BLD: PRESENT — SIGNIFICANT CHANGE UP
NITRITE UR-MCNC: NEGATIVE — SIGNIFICANT CHANGE UP
NRBC # BLD: 0 /100 WBCS — SIGNIFICANT CHANGE UP (ref 0–0)
NT-PROBNP SERPL-SCNC: 87 PG/ML — SIGNIFICANT CHANGE UP
OB PNL STL: NEGATIVE — SIGNIFICANT CHANGE UP
OPIATES UR-MCNC: NEGATIVE — SIGNIFICANT CHANGE UP
PCP SPEC-MCNC: SIGNIFICANT CHANGE UP
PCP UR-MCNC: NEGATIVE — SIGNIFICANT CHANGE UP
PH UR: 7 — SIGNIFICANT CHANGE UP (ref 5–8)
PLAT MORPH BLD: NORMAL — SIGNIFICANT CHANGE UP
PLATELET # BLD AUTO: 410 K/UL — HIGH (ref 150–400)
PLATELET COUNT - ESTIMATE: ABNORMAL
POTASSIUM SERPL-MCNC: 3.2 MMOL/L — LOW (ref 3.5–5.3)
POTASSIUM SERPL-SCNC: 3.2 MMOL/L — LOW (ref 3.5–5.3)
PROT SERPL-MCNC: 6.2 G/DL — LOW (ref 6.4–8.2)
PROT UR-MCNC: NEGATIVE MG/DL — SIGNIFICANT CHANGE UP
RBC # BLD: 1.99 M/UL — LOW (ref 4.2–5.8)
RBC # FLD: 14.9 % — HIGH (ref 10.3–14.5)
RBC BLD AUTO: ABNORMAL
SARS-COV-2 RNA SPEC QL NAA+PROBE: SIGNIFICANT CHANGE UP
SODIUM SERPL-SCNC: 144 MMOL/L — SIGNIFICANT CHANGE UP (ref 132–145)
SP GR SPEC: <=1.005 — SIGNIFICANT CHANGE UP (ref 1–1.03)
THC UR QL: NEGATIVE — SIGNIFICANT CHANGE UP
TROPONIN I, HIGH SENSITIVITY RESULT: 44.3 NG/L — SIGNIFICANT CHANGE UP
TSH SERPL-MCNC: 0.96 UIU/ML — SIGNIFICANT CHANGE UP (ref 0.36–3.74)
UROBILINOGEN FLD QL: 0.2 E.U./DL — SIGNIFICANT CHANGE UP
WBC # BLD: 8.95 K/UL — SIGNIFICANT CHANGE UP (ref 3.8–10.5)
WBC # FLD AUTO: 8.95 K/UL — SIGNIFICANT CHANGE UP (ref 3.8–10.5)

## 2022-07-14 PROCEDURE — 93010 ELECTROCARDIOGRAM REPORT: CPT

## 2022-07-14 PROCEDURE — 71045 X-RAY EXAM CHEST 1 VIEW: CPT | Mod: 26

## 2022-07-14 PROCEDURE — 99285 EMERGENCY DEPT VISIT HI MDM: CPT

## 2022-07-14 PROCEDURE — 70450 CT HEAD/BRAIN W/O DYE: CPT | Mod: 26

## 2022-07-14 RX ORDER — SODIUM CHLORIDE 9 MG/ML
1000 INJECTION INTRAMUSCULAR; INTRAVENOUS; SUBCUTANEOUS ONCE
Refills: 0 | Status: COMPLETED | OUTPATIENT
Start: 2022-07-14 | End: 2022-07-14

## 2022-07-14 RX ORDER — POTASSIUM CHLORIDE 20 MEQ
40 PACKET (EA) ORAL ONCE
Refills: 0 | Status: COMPLETED | OUTPATIENT
Start: 2022-07-14 | End: 2022-07-14

## 2022-07-14 RX ADMIN — SODIUM CHLORIDE 1000 MILLILITER(S): 9 INJECTION INTRAMUSCULAR; INTRAVENOUS; SUBCUTANEOUS at 15:20

## 2022-07-14 RX ADMIN — Medication 40 MILLIEQUIVALENT(S): at 15:18

## 2022-07-14 RX ADMIN — SODIUM CHLORIDE 1000 MILLILITER(S): 9 INJECTION INTRAMUSCULAR; INTRAVENOUS; SUBCUTANEOUS at 14:00

## 2022-07-14 NOTE — ED PROVIDER NOTE - CLINICAL SUMMARY MEDICAL DECISION MAKING FREE TEXT BOX
58 y/o M with Hx of PTSD and stroke presents to the ED for evaluation s/p syncopal episode. Plan for syncope workup although Pt is well appearing. Suspect syncope was likely due to vasovagal versus dehydration. Will speak with social work to provide walker or other forms of assistance. Will reassess clinically after results have been obtained.

## 2022-07-14 NOTE — H&P ADULT - PROBLEM SELECTOR PLAN 3
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 on asa, plavix and a blood thinner however patient cannot recall the name. CT head on admission w/o new stroke.   -restart AC when appropriate  -SCDs for dvt ppx

## 2022-07-14 NOTE — H&P ADULT - ASSESSMENT
syncope  -orthostatic anemia in setting of blood loss anemia     hx of IVDU  utox + for methadone    hx of CVA 3-4 months ago syncope  -orthostatic anemia in setting of blood loss anemia     anemia  bleeding anywhere?  last colonoscopy?  on blood thinners?  unintentional wt loss?  2prbc, iron, b12 folate, MV    hx of IVDU  utox + for methadone    hx of CVA 3-4 months ago 58 y/o M with PMH of drug abuse (heroine intranasal and K2 last use june 2nd), PTSD, depression on lexapro, and stroke [4 months ago w/ R sided residual weakness walks with a cane) presents to the ED s/p syncopal episode

## 2022-07-14 NOTE — H&P ADULT - PROBLEM SELECTOR PLAN 2
found to have Hg of 5.6. orthostatics + at [facility]. No signs of acute bleed on ROS> likely iron def anemia in the setting of recently starting blood thinners after stroke 4 months ago. iron studies not received prior to blood transfusion.  -ordered for 2 units prbc  -consider iron infusion  -GI consult in AM Found to have Hg of 5.6. orthostatics + at [facility]. No signs of acute bleed on ROS> likely iron def anemia in the setting of recently starting blood thinners after stroke 4 months ago. iron studies not received prior to blood transfusion.  -ordered for 2 units prbc  -consider iron infusion  -GI consult in AM  -f/u iron studies (drawn after transfusion), B12, folate  -f/u FOBT  -holding AC

## 2022-07-14 NOTE — H&P ADULT - NSHPLABSRESULTS_GEN_ALL_CORE
LABS:                        5.6    8.95  )-----------( 410      ( 14 Jul 2022 13:41 )             18.1     07-14    144  |  107  |  18  ----------------------------<  115<H>  3.2<L>   |  32<H>  |  0.87    Ca    8.9      14 Jul 2022 13:41  Mg     2.4     07-14    TPro  6.2<L>  /  Alb  3.0<L>  /  TBili  0.2  /  DBili  x   /  AST  14<L>  /  ALT  25  /  AlkPhos  63  07-14      Urinalysis Basic - ( 14 Jul 2022 13:41 )    Color: Yellow / Appearance: Clear / SG: <=1.005 / pH: x  Gluc: x / Ketone: NEGATIVE  / Bili: NEGATIVE / Urobili: 0.2 E.U./dL   Blood: x / Protein: NEGATIVE mg/dL / Nitrite: NEGATIVE   Leuk Esterase: NEGATIVE / RBC: x / WBC x   Sq Epi: x / Non Sq Epi: x / Bacteria: x      CAPILLARY BLOOD GLUCOSE      POCT Blood Glucose.: 155 mg/dL (14 Jul 2022 12:56)      RADIOLOGY & ADDITIONAL TESTS: Reviewed.

## 2022-07-14 NOTE — ED PROVIDER NOTE - PROGRESS NOTE DETAILS
work up significant for anemia, explaining syncope and orthostatics. Will admit for transfussion and anemia work up. pt better after fluidsSpoke to hospitalist.

## 2022-07-14 NOTE — H&P ADULT - PROBLEM SELECTOR PLAN 1
Patient w reported syncope lasting a few seconds. no post ictal state immediately returned back to baseline. likely 2/2 to symptomatic anemia. [facility] Patient denies prodromal symptoms. EKG unremarkable. CT head w/o repeat stroke. No hx or witnessed seizure activity.  -f/u tsh  -plan as below Patient w reported syncope lasting a few seconds. no post ictal state immediately returned back to baseline. likely 2/2 to symptomatic anemia. found to have Hg of 5.6. orthostatics + at [facility]. Patient denies prodromal symptoms. EKG unremarkable. CT head w/o repeat stroke. No hx or witnessed seizure activity.  -f/u tsh  -f/u repeat orthostatics  -plan as below

## 2022-07-14 NOTE — ED PROVIDER NOTE - OBJECTIVE STATEMENT
56 y/o M with PMH of drug abuse [heroin], PTSD, and stroke [R sided residual weakness; Walks with a cane] presents to the ED s/p syncopal episode at [facility] [circumstances unknown]. 58 y/o M with PMH of drug abuse [heroin], PTSD, and stroke [R sided residual weakness; Walks with a cane] presents to the ED s/p syncopal episode at [facility] [circumstances unknown]. Pt denies recent drug or alcohol intake. He arrives with a borderline BP but is not tachycardic. Pt denies fevers, chills, CP, SOB, Abd pain, N/V, or focal neurological deficits.

## 2022-07-14 NOTE — ED ADULT NURSE NOTE - OBJECTIVE STATEMENT
57y male BIBEMS for syncope. Slightly hypotensive in triage, see repeat BP in flow sheet. Pt denies pain, denies dizziness. Hx of stroke about 3-4 months ago. Per EMS pt has right sided body deficits, pt denies. Pt is able to move and use right arm and leg without difficulty. Denies weakness.

## 2022-07-14 NOTE — H&P ADULT - NSHPSOCIALHISTORY_GEN_ALL_CORE
Work:  Tobacco use:   EtOH use:  Illicit drug use:    Living situation: EtOH use: last drink 1 month ago  Illicit drug use: heroin intranasal, K2    Living situation: shelter EtOH use: last drink 1 month ago  Illicit drug use: heroin intranasal, K2, denies hx of IVDU    Living situation: shelter

## 2022-07-14 NOTE — ED ADULT TRIAGE NOTE - CHIEF COMPLAINT QUOTE
as per EMs patient had a syncopal episode outside of [facility]. patient denies drug use. hx of stroke 3 months ago. right side defieit as per EMs. tyra has no complaints during triage. as per EMs patient had a syncopal episode outside of [facility]. patient denies drug use. hx of stroke 3 months ago. right side defieit as per EMS from previous stroke. tyra has no complaints during triage.

## 2022-07-14 NOTE — ED ADULT NURSE NOTE - NSFALLRSKPASTHIST_ED_ALL_ED
IR POST OP NOTE    Procedure:R thoracentesis      Pre Op DX:Effusion, soa      Post Op DX:same      Anesthesia: Local      Findings:Large, cloudy yellow fluid. See nursing for total volume. Sent for all requested.       Complications:No immediate.       Provider Signature: Dr. Carlos Flores   no

## 2022-07-14 NOTE — ED ADULT NURSE NOTE - CHIEF COMPLAINT QUOTE
as per EMs patient had a syncopal episode outside of [facility]. patient denies drug use. hx of stroke 3 months ago. right side defieit as per EMS from previous stroke. tyra has no complaints during triage.

## 2022-07-14 NOTE — H&P ADULT - NSHPPHYSICALEXAM_GEN_ALL_CORE
PHYSICAL EXAM:    General: WDWN  HEENT: NC/AT; PERRL, anicteric sclera; MMM  Neck: supple  Cardiovascular: +S1/S2, RRR  Respiratory: CTA B/L; no W/R/R  Gastrointestinal: soft, NT/ND; +BSx4  Extremities: WWP; no edema, clubbing or cyanosis  Vascular: 2+ radial, DP/PT pulses B/L  Neurological: AAOx3; no focal deficits  Psychiatric: pleasant mood and affect  Dermatologic: no appreciable wounds or damage to the skin    · CONSTITUTIONAL: Well appearing, awake, alert, oriented to person, place, time/situation and in no apparent distress.  · ENMT: Airway patent.  · EYES: Clear bilaterally, pupils equal, round and reactive to light.  · CARDIAC: Normal rate, regular rhythm.  Heart sounds S1, S2.  No murmurs, rubs or gallops.  · RESPIRATORY: Breath sounds clear and equal bilaterally.  · GASTROINTESTINAL: Abdomen soft, non-tender, no guarding.  · NEUROLOGICAL: 4/5 strength to the RLE.  · SKIN: Skin normal color for race, warm, dry and intact. No evidence of rash. PHYSICAL EXAM:    · CONSTITUTIONAL: Well appearing, awake, alert, oriented to person, place, time/situation and in no apparent distress.  · ENMT: Airway patent.  · EYES: Clear bilaterally, pupils equal, round and reactive to light.  · CARDIAC: Normal rate, regular rhythm.  Heart sounds S1, S2.  No murmurs, rubs or gallops.  · RESPIRATORY: Breath sounds clear and equal bilaterally.  · GASTROINTESTINAL: Abdomen soft, non-tender, no guarding.  · NEUROLOGICAL: 4/5 strength to the RLE.  · SKIN: Skin normal color for race, warm, dry and intact. No evidence of rash.

## 2022-07-14 NOTE — H&P ADULT - PROBLEM SELECTOR PLAN 4
Hx of heroine use, K2 and etoh. utox + for methadone. patient denies being on methadone. states last use was 6/2. Denies hx of IVDU. denies hx of HIV or Hep C. [facility] 0 on arrival.   -[facility] protocol  -c/w thiamine, b12 folate supplementation

## 2022-07-14 NOTE — ED BEHAVIORAL HEALTH NOTE - BEHAVIORAL HEALTH NOTE
SW was consulted to the ED by Provider to assist PT with obtaining a walker.  PT was provided a walker from the equipment room.  Team made aware and SW made available for further assistance.

## 2022-07-14 NOTE — H&P ADULT - PROBLEM SELECTOR PLAN 6
Fluids: none  Electrolytes: Mg>2, K>4  Nutrition:  No IVF currently needed, replete lytes PRN  Prophylaxis: SCD  Activity: AAT, OOBTC  GI: none  C: FC  Dispo: Admit to F

## 2022-07-14 NOTE — H&P ADULT - HISTORY OF PRESENT ILLNESS
HPI:   58 y/o M with PMH of drug abuse [heroin], PTSD, and stroke [R sided residual weakness; Walks with a cane] presents to the ED s/p syncopal episode at [facility] [circumstances unknown]. Pt denies recent drug or alcohol intake. He arrives with a borderline BP but is not tachycardic. Pt denies fevers, chills, CP, SOB, Abd pain, N/V, or focal neurological deficits.    as per EMs patient had a syncopal episode outside of [facility]. patient denies drug use. hx of stroke 3 months ago. right side defieit as per EMS from previous stroke. tyra has no complaints during triage.    orthothats + at [facility]    no fever, no nausea, no vomiting, no CP, SOB, Abd pain    In the ED:  Initial vital signs: T: 98.9F, HR: 87, BP: 90/60, R: 17, SpO2: 100% on RA  Labs: significant for hg 5.6, k 3.2, protein 6.2, alb 3.0, utox + for methadone  Imaging: CT head: IMPRESSION: No intracranial hemorrhage or acute transcortical infarct.   Old left basal ganglia lacunar infarct.  CXR: hyperinflated  EKG: NSR normal axis, no pr or wtc prolongation. non ischemic   Medications: none  Consults: none      HPI:   58 y/o M with PMH of drug abuse (heroine intranasal and K2 last use june 2nd), PTSD, depression on lexapro, and stroke [4 months ago w/ R sided residual weakness walks with a cane) presents to the ED s/p syncopal episode at [facility] shelter. Patient states he was getting up from sitting down felt light headed/dizziness resulting in a few seconds of LOC. Patient states he immediately returned to baseline. Patient states this has never happened before. He denies hx of seizures. denies hitting his head or LOC. Patient denies CP, SOB, Palpitations Hx of seizures. Denies hx of low blood pressure or hx of anemia. Patient states he had a stroke 4 months ago. has been on asa, plavix and a 3rd medication which he a blood thinner-he cannot recall the name. He denies melena, hematochezia, hematemesis hematuria. Patient states last colonoscopy was 5 years ago and was normal. Patient has no known hx of anemia. denies loss of appetite or unintentional weight loss. all other ROS unremarkable.   Pt denies fevers, chills, CP, SOB, Abd pain, N/V, or focal neurological deficits.    orthothats + at [facility]      In the ED:  Initial vital signs: T: 98.9F, HR: 87, BP: 90/60, R: 17, SpO2: 100% on RA  Labs: significant for hg 5.6, k 3.2, protein 6.2, alb 3.0, utox + for methadone  Imaging: CT head: IMPRESSION: No intracranial hemorrhage or acute transcortical infarct.   Old left basal ganglia lacunar infarct.  CXR: hyperinflated  EKG: NSR normal axis, no pr or qtc prolongation. non ischemic   Medications: none  Consults: none

## 2022-07-15 DIAGNOSIS — Z29.9 ENCOUNTER FOR PROPHYLACTIC MEASURES, UNSPECIFIED: ICD-10-CM

## 2022-07-15 DIAGNOSIS — I63.9 CEREBRAL INFARCTION, UNSPECIFIED: ICD-10-CM

## 2022-07-15 DIAGNOSIS — R55 SYNCOPE AND COLLAPSE: ICD-10-CM

## 2022-07-15 DIAGNOSIS — F43.10 POST-TRAUMATIC STRESS DISORDER, UNSPECIFIED: ICD-10-CM

## 2022-07-15 DIAGNOSIS — F19.10 OTHER PSYCHOACTIVE SUBSTANCE ABUSE, UNCOMPLICATED: ICD-10-CM

## 2022-07-15 DIAGNOSIS — D64.9 ANEMIA, UNSPECIFIED: ICD-10-CM

## 2022-07-15 LAB
A1C WITH ESTIMATED AVERAGE GLUCOSE RESULT: 5.3 % — SIGNIFICANT CHANGE UP (ref 4–5.6)
ALBUMIN SERPL ELPH-MCNC: 3.5 G/DL — SIGNIFICANT CHANGE UP (ref 3.3–5)
ALP SERPL-CCNC: 62 U/L — SIGNIFICANT CHANGE UP (ref 40–120)
ALT FLD-CCNC: 11 U/L — SIGNIFICANT CHANGE UP (ref 10–45)
ANION GAP SERPL CALC-SCNC: 10 MMOL/L — SIGNIFICANT CHANGE UP (ref 5–17)
ANISOCYTOSIS BLD QL: SLIGHT — SIGNIFICANT CHANGE UP
AST SERPL-CCNC: 13 U/L — SIGNIFICANT CHANGE UP (ref 10–40)
BASOPHILS # BLD AUTO: 0.17 K/UL — SIGNIFICANT CHANGE UP (ref 0–0.2)
BASOPHILS NFR BLD AUTO: 1.8 % — SIGNIFICANT CHANGE UP (ref 0–2)
BILIRUB SERPL-MCNC: 0.3 MG/DL — SIGNIFICANT CHANGE UP (ref 0.2–1.2)
BLD GP AB SCN SERPL QL: NEGATIVE — SIGNIFICANT CHANGE UP
BLD GP AB SCN SERPL QL: NEGATIVE — SIGNIFICANT CHANGE UP
BUN SERPL-MCNC: 13 MG/DL — SIGNIFICANT CHANGE UP (ref 7–23)
CALCIUM SERPL-MCNC: 8.4 MG/DL — SIGNIFICANT CHANGE UP (ref 8.4–10.5)
CHLORIDE SERPL-SCNC: 109 MMOL/L — HIGH (ref 96–108)
CHOLEST SERPL-MCNC: 101 MG/DL — SIGNIFICANT CHANGE UP
CO2 SERPL-SCNC: 23 MMOL/L — SIGNIFICANT CHANGE UP (ref 22–31)
CREAT SERPL-MCNC: 0.68 MG/DL — SIGNIFICANT CHANGE UP (ref 0.5–1.3)
EGFR: 107 ML/MIN/1.73M2 — SIGNIFICANT CHANGE UP
EOSINOPHIL # BLD AUTO: 0.24 K/UL — SIGNIFICANT CHANGE UP (ref 0–0.5)
EOSINOPHIL NFR BLD AUTO: 2.6 % — SIGNIFICANT CHANGE UP (ref 0–6)
ESTIMATED AVERAGE GLUCOSE: 105 MG/DL — SIGNIFICANT CHANGE UP (ref 68–114)
FERRITIN SERPL-MCNC: 6 NG/ML — LOW (ref 30–400)
FOLATE SERPL-MCNC: 13.5 NG/ML — SIGNIFICANT CHANGE UP
GIANT PLATELETS BLD QL SMEAR: PRESENT — SIGNIFICANT CHANGE UP
GLUCOSE SERPL-MCNC: 98 MG/DL — SIGNIFICANT CHANGE UP (ref 70–99)
HCT VFR BLD CALC: 19.7 % — CRITICAL LOW (ref 39–50)
HCT VFR BLD CALC: 27.3 % — LOW (ref 39–50)
HCV AB S/CO SERPL IA: 0.38 S/CO — SIGNIFICANT CHANGE UP
HCV AB SERPL-IMP: SIGNIFICANT CHANGE UP
HDLC SERPL-MCNC: 50 MG/DL — SIGNIFICANT CHANGE UP
HGB BLD-MCNC: 6.1 G/DL — CRITICAL LOW (ref 13–17)
HGB BLD-MCNC: 8.6 G/DL — LOW (ref 13–17)
HYPOCHROMIA BLD QL: SLIGHT — SIGNIFICANT CHANGE UP
IRON SATN MFR SERPL: 11 % — LOW (ref 16–55)
IRON SATN MFR SERPL: 28 UG/DL — LOW (ref 45–165)
LIPID PNL WITH DIRECT LDL SERPL: 44 MG/DL — SIGNIFICANT CHANGE UP
LYMPHOCYTES # BLD AUTO: 2.42 K/UL — SIGNIFICANT CHANGE UP (ref 1–3.3)
LYMPHOCYTES # BLD AUTO: 25.7 % — SIGNIFICANT CHANGE UP (ref 13–44)
MACROCYTES BLD QL: SLIGHT — SIGNIFICANT CHANGE UP
MANUAL SMEAR VERIFICATION: SIGNIFICANT CHANGE UP
MCHC RBC-ENTMCNC: 26.1 PG — LOW (ref 27–34)
MCHC RBC-ENTMCNC: 27.2 PG — SIGNIFICANT CHANGE UP (ref 27–34)
MCHC RBC-ENTMCNC: 31 GM/DL — LOW (ref 32–36)
MCHC RBC-ENTMCNC: 31.5 GM/DL — LOW (ref 32–36)
MCV RBC AUTO: 82.7 FL — SIGNIFICANT CHANGE UP (ref 80–100)
MCV RBC AUTO: 87.9 FL — SIGNIFICANT CHANGE UP (ref 80–100)
MICROCYTES BLD QL: SLIGHT — SIGNIFICANT CHANGE UP
MONOCYTES # BLD AUTO: 0.75 K/UL — SIGNIFICANT CHANGE UP (ref 0–0.9)
MONOCYTES NFR BLD AUTO: 8 % — SIGNIFICANT CHANGE UP (ref 2–14)
NEUTROPHILS # BLD AUTO: 5.82 K/UL — SIGNIFICANT CHANGE UP (ref 1.8–7.4)
NEUTROPHILS NFR BLD AUTO: 61.9 % — SIGNIFICANT CHANGE UP (ref 43–77)
NON HDL CHOLESTEROL: 51 MG/DL — SIGNIFICANT CHANGE UP
NRBC # BLD: 0 /100 WBCS — SIGNIFICANT CHANGE UP (ref 0–0)
OVALOCYTES BLD QL SMEAR: SLIGHT — SIGNIFICANT CHANGE UP
PLAT MORPH BLD: ABNORMAL
PLATELET # BLD AUTO: 380 K/UL — SIGNIFICANT CHANGE UP (ref 150–400)
PLATELET # BLD AUTO: 386 K/UL — SIGNIFICANT CHANGE UP (ref 150–400)
POLYCHROMASIA BLD QL SMEAR: SLIGHT — SIGNIFICANT CHANGE UP
POTASSIUM SERPL-MCNC: 3.4 MMOL/L — LOW (ref 3.5–5.3)
POTASSIUM SERPL-SCNC: 3.4 MMOL/L — LOW (ref 3.5–5.3)
PROT SERPL-MCNC: 5.5 G/DL — LOW (ref 6–8.3)
RBC # BLD: 2.24 M/UL — LOW (ref 4.2–5.8)
RBC # BLD: 3.3 M/UL — LOW (ref 4.2–5.8)
RBC # FLD: 15.8 % — HIGH (ref 10.3–14.5)
RBC # FLD: 19.6 % — HIGH (ref 10.3–14.5)
RBC BLD AUTO: ABNORMAL
RETICS #: 43.9 K/UL — SIGNIFICANT CHANGE UP (ref 25–125)
RETICS/RBC NFR: 2 % — SIGNIFICANT CHANGE UP (ref 0.5–2.5)
RH IG SCN BLD-IMP: POSITIVE — SIGNIFICANT CHANGE UP
RH IG SCN BLD-IMP: POSITIVE — SIGNIFICANT CHANGE UP
SCHISTOCYTES BLD QL AUTO: SLIGHT — SIGNIFICANT CHANGE UP
SODIUM SERPL-SCNC: 142 MMOL/L — SIGNIFICANT CHANGE UP (ref 135–145)
SPHEROCYTES BLD QL SMEAR: SLIGHT — SIGNIFICANT CHANGE UP
TIBC SERPL-MCNC: 249 UG/DL — SIGNIFICANT CHANGE UP (ref 220–430)
TRANSFERRIN SERPL-MCNC: 216 MG/DL — SIGNIFICANT CHANGE UP (ref 200–360)
TRIGL SERPL-MCNC: 35 MG/DL — SIGNIFICANT CHANGE UP
TSH SERPL-MCNC: 1.25 UIU/ML — SIGNIFICANT CHANGE UP (ref 0.27–4.2)
UIBC SERPL-MCNC: 221 UG/DL — SIGNIFICANT CHANGE UP (ref 110–370)
VIT B12 SERPL-MCNC: 274 PG/ML — SIGNIFICANT CHANGE UP (ref 232–1245)
WBC # BLD: 11.56 K/UL — HIGH (ref 3.8–10.5)
WBC # BLD: 9.4 K/UL — SIGNIFICANT CHANGE UP (ref 3.8–10.5)
WBC # FLD AUTO: 11.56 K/UL — HIGH (ref 3.8–10.5)
WBC # FLD AUTO: 9.4 K/UL — SIGNIFICANT CHANGE UP (ref 3.8–10.5)

## 2022-07-15 PROCEDURE — 99232 SBSQ HOSP IP/OBS MODERATE 35: CPT

## 2022-07-15 RX ORDER — POTASSIUM CHLORIDE 20 MEQ
40 PACKET (EA) ORAL EVERY 4 HOURS
Refills: 0 | Status: COMPLETED | OUTPATIENT
Start: 2022-07-15 | End: 2022-07-15

## 2022-07-15 RX ORDER — FOLIC ACID 0.8 MG
1 TABLET ORAL DAILY
Refills: 0 | Status: DISCONTINUED | OUTPATIENT
Start: 2022-07-15 | End: 2022-07-18

## 2022-07-15 RX ORDER — NICOTINE POLACRILEX 2 MG
1 GUM BUCCAL DAILY
Refills: 0 | Status: DISCONTINUED | OUTPATIENT
Start: 2022-07-15 | End: 2022-07-16

## 2022-07-15 RX ORDER — PANTOPRAZOLE SODIUM 20 MG/1
40 TABLET, DELAYED RELEASE ORAL EVERY 12 HOURS
Refills: 0 | Status: DISCONTINUED | OUTPATIENT
Start: 2022-07-15 | End: 2022-07-18

## 2022-07-15 RX ORDER — ESCITALOPRAM OXALATE 10 MG/1
20 TABLET, FILM COATED ORAL DAILY
Refills: 0 | Status: DISCONTINUED | OUTPATIENT
Start: 2022-07-15 | End: 2022-07-18

## 2022-07-15 RX ORDER — PREGABALIN 225 MG/1
1000 CAPSULE ORAL DAILY
Refills: 0 | Status: DISCONTINUED | OUTPATIENT
Start: 2022-07-15 | End: 2022-07-18

## 2022-07-15 RX ORDER — POTASSIUM CHLORIDE 20 MEQ
40 PACKET (EA) ORAL ONCE
Refills: 0 | Status: DISCONTINUED | OUTPATIENT
Start: 2022-07-15 | End: 2022-07-15

## 2022-07-15 RX ADMIN — Medication 40 MILLIEQUIVALENT(S): at 09:02

## 2022-07-15 RX ADMIN — Medication 1 MILLIGRAM(S): at 10:58

## 2022-07-15 RX ADMIN — PANTOPRAZOLE SODIUM 40 MILLIGRAM(S): 20 TABLET, DELAYED RELEASE ORAL at 22:34

## 2022-07-15 RX ADMIN — PREGABALIN 1000 MICROGRAM(S): 225 CAPSULE ORAL at 10:58

## 2022-07-15 RX ADMIN — Medication 40 MILLIEQUIVALENT(S): at 07:34

## 2022-07-15 RX ADMIN — PANTOPRAZOLE SODIUM 40 MILLIGRAM(S): 20 TABLET, DELAYED RELEASE ORAL at 10:56

## 2022-07-15 RX ADMIN — Medication 1 TABLET(S): at 10:59

## 2022-07-15 RX ADMIN — ESCITALOPRAM OXALATE 20 MILLIGRAM(S): 10 TABLET, FILM COATED ORAL at 12:50

## 2022-07-15 NOTE — DIETITIAN INITIAL EVALUATION ADULT - OTHER CALCULATIONS
Based on Standards of Care pt within % IBW thus actual body weight used for all calculations. Needs adjusted for malnutrition.

## 2022-07-15 NOTE — CONSULT NOTE ADULT - SUBJECTIVE AND OBJECTIVE BOX
56 y/o M with PMH of polysubstance use disorder now on methadone, PTSD, depression on SSRI, and CVA [4 months ago w/ R sided residual weakness walks with a cane) who was admitted after presenting to ED for syncope at [facility] shelter. Was getting up from sitting down felt light headed/dizziness resulting in a few seconds of LOC. Patient states he immediately returned to baseline. Patient states this has never happened before.      Per HPI he denies hx of seizures. denies hitting his head or LOC.  Otherwise denies CP, SOB, Palpitations Hx of seizures.     No know hx of syncope or having been told he is anemic or iron deficiency.  Has been on DAPT since CVA 4 months ago. He denies melena, hematochezia, hematemesis hematuria. Patient states last colonoscopy was 5 years ago and was reportedly normal.  Weight has been stable.  No nausea/vomiting/diarrhea.  Last BM yesterday and looked normal in caliber and color and was formed.     Orthothats + at [facility]    In the ED:  Initial vital signs: T: 98.9F, HR: 87, BP: 90/60, R: 17, SpO2: 100% on RA  Labs: significant for hg 5.6, k 3.2, protein 6.2, alb 3.0, utox + for methadone  Imaging: CT head: IMPRESSION: No intracranial hemorrhage or acute transcortical infarct.   Old left basal ganglia lacunar infarct.  CXR: hyperinflated  EKG: NSR normal axis, no pr or qtc prolongation. non ischemic   Medications: none  Consults: none      (14 Jul 2022 23:42)    Allergies    penicillin (Unknown)    Intolerances      Home Medications:  ESCITALOPRAM 20 MG TABLET: TAKE 1 TABLET BY MOUTH EVERY DAY (15 Jul 2022 01:32)    MEDICATIONS:  MEDICATIONS  (STANDING):  cyanocobalamin 1000 MICROGram(s) Oral daily  escitalopram 20 milliGRAM(s) Oral daily  folic acid 1 milliGRAM(s) Oral daily  multivitamin 1 Tablet(s) Oral daily  pantoprazole  Injectable 40 milliGRAM(s) IV Push every 12 hours    MEDICATIONS  (PRN):  LORazepam   Injectable 2 milliGRAM(s) IV Push once PRN CIWA-Ar score increase by 2 points and a total score of 7 or less    PAST MEDICAL & SURGICAL HISTORY:  Drug abuse      PTSD (post-traumatic stress disorder)        FAMILY HISTORY:  -No known hx of GI disease or malignancy  -No known hx of IBD    SOCIAL HISTORY:  [place]: Long term smoker  Alcohol: No recent ETOH  Illicit Drugs: Prior hx of polysubstance use disorder, now on methadone  From [place]    REVIEW OF SYSTEMS:  All other 10 review of systems is negative unless indicated above.    Vital Signs Last 24 Hrs  T(C): 36.6 (15 Jul 2022 08:54), Max: 36.7 (14 Jul 2022 20:48)  T(F): 97.9 (15 Jul 2022 08:54), Max: 98.1 (14 Jul 2022 20:48)  HR: 79 (15 Jul 2022 08:54) (73 - 86)  BP: 127/87 (15 Jul 2022 08:54) (105/57 - 135/85)  BP(mean): --  RR: 18 (15 Jul 2022 08:54) (16 - 20)  SpO2: 100% (15 Jul 2022 08:54) (95% - 100%)    Parameters below as of 15 Jul 2022 08:54  Patient On (Oxygen Delivery Method): room air        07-14 @ 07:01  -  07-15 @ 07:00  --------------------------------------------------------  IN: 300 mL / OUT: 0 mL / NET: 300 mL        PHYSICAL EXAM:    General: Well developed; well nourished; in no acute distress  Eyes: Anicteric sclerae, moist conjunctivae  HENT: Moist mucous membranes  Neck: Trachea midline, supple  Lungs: Normal respiratory effort and no intercostal retractions  Cardiovascular: RRR  Abdomen: Soft, non-tender non-distended; Normal bowel sounds; No rebound or guarding  Extremities: Normal range of motion, No clubbing, cyanosis or edema  Neurological: Alert and oriented x3  Skin: Warm and dry. No obvious rash    LABS:                        6.1    9.40  )-----------( 386      ( 15 Jul 2022 06:50 )             19.7     07-15    142  |  109<H>  |  13  ----------------------------<  98  3.4<L>   |  23  |  0.68    Ca    8.4      15 Jul 2022 06:50  Mg     2.4     07-14    TPro  5.5<L>  /  Alb  3.5  /  TBili  0.3  /  DBili  x   /  AST  13  /  ALT  11  /  AlkPhos  62  07-15            RADIOLOGY & ADDITIONAL STUDIES:

## 2022-07-15 NOTE — DIETITIAN INITIAL EVALUATION ADULT - PERSON TAUGHT/METHOD
Pt amenable to education; RD provided education in regards to the importance of adequate macro and micronutrients, as well as hydration to support ADLs, maintain energy levels and overall functional/nutritional status. Pt was receptive and verbalized understanding./verbal instruction/teach back - (Patient repeats in own words)/patient instructed

## 2022-07-15 NOTE — DIETITIAN INITIAL EVALUATION ADULT - NUTRITIONGOAL OUTCOME1
Pt to meet at least 75% of EEE via tolerated route that is consistent with GOC; pt will no longer show s/s of malnutrition

## 2022-07-15 NOTE — DIETITIAN INITIAL EVALUATION ADULT - OTHER INFO
This is a 56 y/o M with PMH of drug abuse (heroine intranasal and K2 last use june 2nd), PTSD, depression on lexapro, and stroke [4 months ago w/ R sided residual weakness walks with a cane) presents to the ED s/p syncopal episode.     yo M/F with PMH    Pt seen on 4UR at bedside. Pt stated UBW ~125-130 pounds, which is not consistent with wt upon admission (~116.4 pounds as per RD bedweight measurement obtained). Appetite currently good per pt; PTA, appetite was good per pt. As per diet/24h recall, PTA pt consumed 3 meals per day at the shelter where he resided, which consisted of bagels, cereal, soup, meat/fish, F/V. During current admission, consumption of ~% meals on average as noted per pt. Pt stated he enjoys all foods, specifically [place] food. GI: s/s nondistention noted. Lit: 20. Skin integrity: within normal limits at this time. No edema noted. I/O: 300/0 (300). NKFA. No pain noted as per flowsheets data. No issues chewing/swallowing noted. Pertinent lab values: K below normal range, trending upward; will monitor. Pt is receiving a  diet. Pt is receiving MVI, cyanocobalamin and folic acid, in addition to potassium chloride. Upon visual inspection, RD observed no s/s of malnutrition; . Pt amenable to education; RD provided education in regards to the importance of adequate macro and micronutrients, as well as hydration to support ADLs, maintain energy levels and overall functional/nutritional status. Pt was receptive and verbalized understanding. No additional nutrition-related concerns. RD will remain available. Additional nutrition recommendations listed below to follow.  This is a 58 y/o M with PMH of drug abuse (heroine intranasal and K2 last use june 2nd), PTSD, depression on lexapro, and stroke [4 months ago w/ R sided residual weakness walks with a cane) presents to the ED s/p syncopal episode.     Pt seen on 4UR at bedside. Pt stated UBW ~125-130 pounds, which is not consistent with wt upon admission (~116.4 pounds as per RD bedweight measurement obtained). Appetite currently good per pt; PTA, appetite was good per pt. As per diet/24h recall, PTA pt consumed 3 meals per day at the shelter where he resided, which consisted of bagels, cereal, soup, meat/fish, F/V. During current admission, consumption of ~% meals on average as noted per pt. Pt stated he enjoys all foods, specifically [place] food. GI: s/s nondistention noted. Lit: 20. Skin integrity: within normal limits at this time. No edema noted. I/O: 300/0 (300). NKFA. No pain noted as per flowsheets data. No issues chewing/swallowing noted. Pertinent lab values: K below normal range, trending upward; will monitor. Pt is receiving a Clear Liquids diet. Per RD discussion with MD, pt is on current diet r/t potential scope, if scope does not happen, Regular diet can/will be resumed. Pt is receiving MVI, cyanocobalamin and folic acid, in addition to potassium chloride. Upon visual inspection, RD observed mild orbital fat loss, no other s/s of malnutrition at this time per inspection. Pt amenable to education; RD provided education in regards to the importance of adequate macro and micronutrients, as well as hydration to support ADLs, maintain energy levels and overall functional/nutritional status. Pt was receptive and verbalized understanding. No additional nutrition-related concerns. RD will remain available. Additional nutrition recommendations listed below to follow.

## 2022-07-15 NOTE — PROGRESS NOTE ADULT - SUBJECTIVE AND OBJECTIVE BOX
OVERNIGHT EVENTS: No acute events overnight. Admitted to [facility], received 1 U pRBC infusion (from 3:45 AM to 6:45 AM). No transfusion reactions during or following.     SUBJECTIVE / INTERVAL HPI: Patient seen and examined at bedside. Pt reports currently feeling well. He is eager to eat breakfast. Denies any dizziness, vision changes, chest pain, palpitations, abdominal pain, shortness of breath, vision changes, or new focal muscle weakness or numbness. Denies nausea/vomiting, fever/chills, hematuria, dysuria, hematemesis, or hematochezia. Denies any recent weight changes or anorexia.     12pt ROS otherwise negative.    VITAL SIGNS:  Vital Signs Last 24 Hrs  T(C): 36.4 (15 Jul 2022 06:49), Max: 37.2 (14 Jul 2022 12:52)  T(F): 97.6 (15 Jul 2022 06:49), Max: 98.9 (14 Jul 2022 12:52)  HR: 73 (15 Jul 2022 06:49) (73 - 87)  BP: 135/85 (15 Jul 2022 06:49) (90/60 - 135/85)  BP(mean): --  RR: 16 (15 Jul 2022 06:49) (16 - 20)  SpO2: 100% (15 Jul 2022 06:49) (95% - 100%)    Parameters below as of 15 Jul 2022 06:49  Patient On (Oxygen Delivery Method): room air    PHYSICAL EXAM:    General: NAD, A&Ox4, sitting up comfortably in bed, speaking full sentences  HEENT: PERRL, EOMI, anicteric sclera; MMM, +conjunctival pallor  Neck: no JVD  Cardiovascular: Normal S1/S2; RRR; no M/R/G  Respiratory: no increased work of breathing; CTA B/L; no W/R/R  Gastrointestinal: soft, NT/ND; no rebound tenderness/guarding  Extremities: WWP; no edema, clubbing or cyanosis  Vascular: 2+ radial, DP/PT pulses B/L  Neurological: strength 4/5 RLE     MEDICATIONS:  MEDICATIONS  (STANDING):  cyanocobalamin 1000 MICROGram(s) Oral daily  folic acid 1 milliGRAM(s) Oral daily  multivitamin 1 Tablet(s) Oral daily  potassium chloride    Tablet ER 40 milliEquivalent(s) Oral every 4 hours    MEDICATIONS  (PRN):  LORazepam   Injectable 2 milliGRAM(s) IV Push once PRN CIWA-Ar score increase by 2 points and a total score of 7 or less      ALLERGIES:  penicillin (hives, facial swelling)      LABS:                        6.1    9.40  )-----------( 386      ( 15 Jul 2022 06:50 )             19.7     07-15    142  |  109<H>  |  13  ----------------------------<  98  3.4<L>   |  23  |  0.68    Ca    8.4      15 Jul 2022 06:50  Mg     2.4     07-14    TPro  5.5<L>  /  Alb  3.5  /  TBili  0.3  /  DBili  x   /  AST  13  /  ALT  11  /  AlkPhos  62  07-15      Urinalysis Basic - ( 14 Jul 2022 13:41 )    Color: Yellow / Appearance: Clear / SG: <=1.005 / pH: x  Gluc: x / Ketone: NEGATIVE  / Bili: NEGATIVE / Urobili: 0.2 E.U./dL   Blood: x / Protein: NEGATIVE mg/dL / Nitrite: NEGATIVE   Leuk Esterase: NEGATIVE / RBC: x / WBC x   Sq Epi: x / Non Sq Epi: x / Bacteria: x      CAPILLARY BLOOD GLUCOSE  POCT Blood Glucose.: 155 mg/dL (14 Jul 2022 12:56)    RADIOLOGY & ADDITIONAL TESTS: Reviewed. OVERNIGHT EVENTS: No acute events overnight. Admitted to [facility], received 2 U pRBC infusion.    SUBJECTIVE / INTERVAL HPI: Patient seen and examined at bedside. Pt reports currently feeling well. He is eager to eat breakfast. Denies any dizziness, vision changes, chest pain, palpitations, abdominal pain, shortness of breath, vision changes, or new focal muscle weakness or numbness. Denies nausea/vomiting, fever/chills, hematuria, dysuria, hematemesis, or hematochezia. Denies any recent weight changes or anorexia.     12pt ROS otherwise negative.    VITAL SIGNS:  Vital Signs Last 24 Hrs  T(C): 36.4 (15 Jul 2022 06:49), Max: 37.2 (14 Jul 2022 12:52)  T(F): 97.6 (15 Jul 2022 06:49), Max: 98.9 (14 Jul 2022 12:52)  HR: 73 (15 Jul 2022 06:49) (73 - 87)  BP: 135/85 (15 Jul 2022 06:49) (90/60 - 135/85)  BP(mean): --  RR: 16 (15 Jul 2022 06:49) (16 - 20)  SpO2: 100% (15 Jul 2022 06:49) (95% - 100%)    Parameters below as of 15 Jul 2022 06:49  Patient On (Oxygen Delivery Method): room air    PHYSICAL EXAM:    General: NAD, A&Ox4, sitting up comfortably in bed, speaking full sentences  HEENT: PERRL, EOMI, anicteric sclera; MMM, +conjunctival pallor  Neck: no JVD  Cardiovascular: Normal S1/S2; RRR; no M/R/G  Respiratory: no increased work of breathing; CTA B/L; no W/R/R  Gastrointestinal: soft, NT/ND; no rebound tenderness/guarding  Extremities: WWP; no edema, clubbing or cyanosis  Vascular: 2+ radial, DP/PT pulses B/L  Neurological: strength 4/5 RLE     MEDICATIONS:  MEDICATIONS  (STANDING):  cyanocobalamin 1000 MICROGram(s) Oral daily  folic acid 1 milliGRAM(s) Oral daily  multivitamin 1 Tablet(s) Oral daily  potassium chloride    Tablet ER 40 milliEquivalent(s) Oral every 4 hours    MEDICATIONS  (PRN):  LORazepam   Injectable 2 milliGRAM(s) IV Push once PRN CIWA-Ar score increase by 2 points and a total score of 7 or less      ALLERGIES:  penicillin (hives, facial swelling)      LABS:                        6.1    9.40  )-----------( 386      ( 15 Jul 2022 06:50 )             19.7     07-15    142  |  109<H>  |  13  ----------------------------<  98  3.4<L>   |  23  |  0.68    Ca    8.4      15 Jul 2022 06:50  Mg     2.4     07-14    TPro  5.5<L>  /  Alb  3.5  /  TBili  0.3  /  DBili  x   /  AST  13  /  ALT  11  /  AlkPhos  62  07-15      Urinalysis Basic - ( 14 Jul 2022 13:41 )    Color: Yellow / Appearance: Clear / SG: <=1.005 / pH: x  Gluc: x / Ketone: NEGATIVE  / Bili: NEGATIVE / Urobili: 0.2 E.U./dL   Blood: x / Protein: NEGATIVE mg/dL / Nitrite: NEGATIVE   Leuk Esterase: NEGATIVE / RBC: x / WBC x   Sq Epi: x / Non Sq Epi: x / Bacteria: x      CAPILLARY BLOOD GLUCOSE  POCT Blood Glucose.: 155 mg/dL (14 Jul 2022 12:56)    RADIOLOGY & ADDITIONAL TESTS: Reviewed.

## 2022-07-15 NOTE — DIETITIAN INITIAL EVALUATION ADULT - ADD RECOMMEND
1. Continue with current diet order and advance to Regular diet when medically feasible for pt   >>Ensure Max ONS BID (150kcal, 30g pro)  >>Magic Cup QD (290kcal, 9gpro)    2. Encourage pt to meet nutritional needs as able   3. Monitor PO intakes, trend weights (weekly), monitor skin integrity, monitor labs (electrolytes, CMP), monitor GI fxn   4. Encourage adherence to diet education (reinforce as able)   5. Continue folic acid, cyanocobalamin, MVI supplementation  6. Pain and bowel regimen per team   7. Will continue to assess/honor preferences as able   8. Align nutrition interventions with GOC at all times

## 2022-07-15 NOTE — PROGRESS NOTE ADULT - ASSESSMENT
58 y/o M with PMH of active polysubstance use (heroine intranasal, alcohol, and K2 last use June 2nd), PTSD, depression on Lexapro, and stroke (4 months ago w/ R sided residual weakness, walks with a cane) who presents to the ED s/p syncopal episode after standing up abruptly. 56 y/o M with PMH of active polysubstance use (heroine intranasal, alcohol, and K2 last use June 2nd), PTSD, depression on Lexapro, and stroke (4 months ago w/ R sided residual weakness, walks with a cane) who presents to the ED s/p syncopal episode after standing up abruptly. Orthostatics positive in ED. Cardiac & neurologic workup has been negative. Labs are significant for hypochromic normocytic anemia, decreased total protein & albumin, and negative FOBT. Pt is currently admitted to [facility], hemodynamically stable and s/p 2 units PRBCs with symptomatic anemia workup pending. 56 y/o M with PMH of active cigarette smoking (46 pack-year history, 1 PPD), polysubstance use (heroine intranasal, alcohol, and K2 last use June 2nd), PTSD, depression on Lexapro, and stroke (4 months ago w/ R sided residual weakness, walks with a cane) who presents to the ED s/p syncopal episode after standing up abruptly. Orthostatics positive in ED. Cardiac & neurologic workup has been negative. Labs are significant for hypochromic normocytic anemia, decreased total protein & albumin, and negative FOBT. Pt is currently admitted to [facility], hemodynamically stable and s/p 2 units PRBCs with symptomatic anemia workup pending.

## 2022-07-15 NOTE — PROGRESS NOTE ADULT - PROBLEM SELECTOR PLAN 1
Patient w/ reported syncope lasting a few seconds that occurred on abruptly standing up. No post ictal state, immediately returned back to baseline. Likely 2/2 to symptomatic anemia. Found to have Hg of 5.6. Orthostatics + at [facility]. Patient denies prodromal symptoms. EKG unremarkable. CT head w/o new stroke or intracranial hemorrhage. No hx of witnessed seizure activity.  -f/u tsh  -f/u repeat orthostatics   -plan as below Patient w/ reported syncope lasting a few seconds that occurred on abruptly standing up. No post ictal state, immediately returned back to baseline. Likely 2/2 to symptomatic anemia. Found to have Hg of 5.6. Orthostatics + at [facility]. Patient denies prodromal symptoms. EKG unremarkable. CT head w/o new stroke or intracranial hemorrhage. No hx of witnessed seizure activity. TSH wnl (0.961).  -f/u repeat orthostatics   -plan as below Patient w/ reported syncope lasting a few seconds that occurred on abruptly standing up. No post ictal state, immediately returned back to baseline. Likely 2/2 to symptomatic anemia. Found to have Hg of 5.6. Orthostatics + at [facility]. Patient denies prodromal symptoms. EKG unremarkable. CT head w/o new stroke or intracranial hemorrhage. No hx of witnessed seizure activity. TSH wnl (0.961).   -f/u repeat orthostatics - orthostatics 127/87 lying down, 125/83 sitting, 117/78 standing on 7/15 at 9 AM  -plan as below

## 2022-07-15 NOTE — DIETITIAN INITIAL EVALUATION ADULT - PERTINENT MEDS FT
MEDICATIONS  (STANDING):  cyanocobalamin 1000 MICROGram(s) Oral daily  escitalopram 20 milliGRAM(s) Oral daily  folic acid 1 milliGRAM(s) Oral daily  multivitamin 1 Tablet(s) Oral daily  pantoprazole  Injectable 40 milliGRAM(s) IV Push every 12 hours    MEDICATIONS  (PRN):  LORazepam   Injectable 2 milliGRAM(s) IV Push once PRN CIWA-Ar score increase by 2 points and a total score of 7 or less

## 2022-07-15 NOTE — DIETITIAN INITIAL EVALUATION ADULT - PERTINENT LABORATORY DATA
07-15    142  |  109<H>  |  13  ----------------------------<  98  3.4<L>   |  23  |  0.68    Ca    8.4      15 Jul 2022 06:50  Mg     2.4     07-14    TPro  5.5<L>  /  Alb  3.5  /  TBili  0.3  /  DBili  x   /  AST  13  /  ALT  11  /  AlkPhos  62  07-15  POCT Blood Glucose.: 155 mg/dL (07-14-22 @ 12:56)  A1C with Estimated Average Glucose Result: 5.3 % (07-15-22 @ 06:50)

## 2022-07-15 NOTE — CONSULT NOTE ADULT - ASSESSMENT
56 y/o M smoker with PMH of polysubstance use disorder now on methadone and CVA [4 months ago w/ R sided residual weakness walks admitted for weakness and found to have iron deficiency anemia without active GIB.  GI consulted for DAISY.    #DAISY: No stigmata of active bleeding with recent normal stool.  Neg GI sx and reassuring and otherwise normal exam.  No apparent cause.    MCV normal however ferritin low *6*. FOBT notably negative with normal MINNIE per primary team.      -No abdominal imaging noted  -[place] within last 5 years reportedly normal    Recommendations:  -Agree with transfusion per primary team  -No indication for inpt endoscopic evaluation though we recommend bidirectional endoscopy as outpt  -Please discharge pt with ferrous sulfate 65 mg once daily + daily miralax.  Dosing of ferrous sulfate may be reduced to every other day if side effects developed.      We will arrange for outpt follow up and pt will be contacted for appointment.    Jose Martin Edgar DO, FACP  Gastroenterology Fellow  Pager: [phone]     58 y/o M smoker with PMH of polysubstance use disorder now on methadone and CVA [4 months ago w/ R sided residual weakness walks admitted for weakness and found to have iron deficiency anemia without active GIB.  GI consulted for DAISY.    #DAISY: No stigmata of active bleeding with recent normal stool.  Neg GI sx and reassuring and otherwise normal exam.  No apparent cause.    MCV normal however ferritin low *6*. FOBT notably negative with normal MINNIE per primary team.      -No abdominal imaging noted  -[place] within last 5 years reportedly normal    Recommendations:  -Agree with transfusion per primary team  -No indication for inpt endoscopic evaluation though we recommend bidirectional endoscopy as outpt  -Please discharge pt with ferrous sulfate 65 mg once daily + daily miralax.  Dosing of ferrous sulfate may be reduced to every other day if side effects developed.      We will arrange for outpt follow up and pt will be contacted for appointment.    Gastroenterology service will sign off  Recommendations discussed with primary team  Case discussed with attending physician  Please recall as needed with any gastroenterological questions    Thank you for this consult.     Jose Martin Edgar DO, FACP  Gastroenterology Fellow  Pager: [phone]

## 2022-07-16 ENCOUNTER — TRANSCRIPTION ENCOUNTER (OUTPATIENT)
Age: 57
End: 2022-07-16

## 2022-07-16 LAB
ALBUMIN SERPL ELPH-MCNC: 3.8 G/DL — SIGNIFICANT CHANGE UP (ref 3.3–5)
ALP SERPL-CCNC: 72 U/L — SIGNIFICANT CHANGE UP (ref 40–120)
ALT FLD-CCNC: 14 U/L — SIGNIFICANT CHANGE UP (ref 10–45)
ANION GAP SERPL CALC-SCNC: 8 MMOL/L — SIGNIFICANT CHANGE UP (ref 5–17)
ANISOCYTOSIS BLD QL: SIGNIFICANT CHANGE UP
AST SERPL-CCNC: 12 U/L — SIGNIFICANT CHANGE UP (ref 10–40)
BASOPHILS # BLD AUTO: 0.09 K/UL — SIGNIFICANT CHANGE UP (ref 0–0.2)
BASOPHILS NFR BLD AUTO: 0.9 % — SIGNIFICANT CHANGE UP (ref 0–2)
BILIRUB SERPL-MCNC: 0.3 MG/DL — SIGNIFICANT CHANGE UP (ref 0.2–1.2)
BUN SERPL-MCNC: 16 MG/DL — SIGNIFICANT CHANGE UP (ref 7–23)
CALCIUM SERPL-MCNC: 9 MG/DL — SIGNIFICANT CHANGE UP (ref 8.4–10.5)
CHLORIDE SERPL-SCNC: 107 MMOL/L — SIGNIFICANT CHANGE UP (ref 96–108)
CO2 SERPL-SCNC: 25 MMOL/L — SIGNIFICANT CHANGE UP (ref 22–31)
CREAT SERPL-MCNC: 0.91 MG/DL — SIGNIFICANT CHANGE UP (ref 0.5–1.3)
DACRYOCYTES BLD QL SMEAR: SLIGHT — SIGNIFICANT CHANGE UP
EGFR: 97 ML/MIN/1.73M2 — SIGNIFICANT CHANGE UP
EOSINOPHIL # BLD AUTO: 0.27 K/UL — SIGNIFICANT CHANGE UP (ref 0–0.5)
EOSINOPHIL NFR BLD AUTO: 2.6 % — SIGNIFICANT CHANGE UP (ref 0–6)
GLUCOSE SERPL-MCNC: 116 MG/DL — HIGH (ref 70–99)
HCT VFR BLD CALC: 28.4 % — LOW (ref 39–50)
HGB BLD-MCNC: 9 G/DL — LOW (ref 13–17)
HYPOCHROMIA BLD QL: SIGNIFICANT CHANGE UP
LYMPHOCYTES # BLD AUTO: 2.75 K/UL — SIGNIFICANT CHANGE UP (ref 1–3.3)
LYMPHOCYTES # BLD AUTO: 26.3 % — SIGNIFICANT CHANGE UP (ref 13–44)
MACROCYTES BLD QL: SLIGHT — SIGNIFICANT CHANGE UP
MAGNESIUM SERPL-MCNC: 2 MG/DL — SIGNIFICANT CHANGE UP (ref 1.6–2.6)
MANUAL SMEAR VERIFICATION: SIGNIFICANT CHANGE UP
MCHC RBC-ENTMCNC: 26.2 PG — LOW (ref 27–34)
MCHC RBC-ENTMCNC: 31.7 GM/DL — LOW (ref 32–36)
MCV RBC AUTO: 82.6 FL — SIGNIFICANT CHANGE UP (ref 80–100)
MICROCYTES BLD QL: SLIGHT — SIGNIFICANT CHANGE UP
MONOCYTES # BLD AUTO: 0.55 K/UL — SIGNIFICANT CHANGE UP (ref 0–0.9)
MONOCYTES NFR BLD AUTO: 5.3 % — SIGNIFICANT CHANGE UP (ref 2–14)
NEUTROPHILS # BLD AUTO: 6.78 K/UL — SIGNIFICANT CHANGE UP (ref 1.8–7.4)
NEUTROPHILS NFR BLD AUTO: 64.9 % — SIGNIFICANT CHANGE UP (ref 43–77)
OVALOCYTES BLD QL SMEAR: SLIGHT — SIGNIFICANT CHANGE UP
PHOSPHATE SERPL-MCNC: 2 MG/DL — LOW (ref 2.5–4.5)
PLAT MORPH BLD: NORMAL — SIGNIFICANT CHANGE UP
PLATELET # BLD AUTO: 422 K/UL — HIGH (ref 150–400)
POIKILOCYTOSIS BLD QL AUTO: SIGNIFICANT CHANGE UP
POLYCHROMASIA BLD QL SMEAR: SLIGHT — SIGNIFICANT CHANGE UP
POTASSIUM SERPL-MCNC: 4.4 MMOL/L — SIGNIFICANT CHANGE UP (ref 3.5–5.3)
POTASSIUM SERPL-SCNC: 4.4 MMOL/L — SIGNIFICANT CHANGE UP (ref 3.5–5.3)
PROT SERPL-MCNC: 5.9 G/DL — LOW (ref 6–8.3)
RBC # BLD: 3.44 M/UL — LOW (ref 4.2–5.8)
RBC # FLD: 20.3 % — HIGH (ref 10.3–14.5)
RBC BLD AUTO: ABNORMAL
SODIUM SERPL-SCNC: 140 MMOL/L — SIGNIFICANT CHANGE UP (ref 135–145)
SPHEROCYTES BLD QL SMEAR: SLIGHT — SIGNIFICANT CHANGE UP
TARGETS BLD QL SMEAR: SLIGHT — SIGNIFICANT CHANGE UP
WBC # BLD: 10.44 K/UL — SIGNIFICANT CHANGE UP (ref 3.8–10.5)
WBC # FLD AUTO: 10.44 K/UL — SIGNIFICANT CHANGE UP (ref 3.8–10.5)

## 2022-07-16 PROCEDURE — 99232 SBSQ HOSP IP/OBS MODERATE 35: CPT

## 2022-07-16 RX ORDER — NICOTINE POLACRILEX 2 MG
1 GUM BUCCAL DAILY
Refills: 0 | Status: DISCONTINUED | OUTPATIENT
Start: 2022-07-16 | End: 2022-07-18

## 2022-07-16 RX ADMIN — PANTOPRAZOLE SODIUM 40 MILLIGRAM(S): 20 TABLET, DELAYED RELEASE ORAL at 12:42

## 2022-07-16 RX ADMIN — PREGABALIN 1000 MICROGRAM(S): 225 CAPSULE ORAL at 12:42

## 2022-07-16 RX ADMIN — Medication 1 TABLET(S): at 12:43

## 2022-07-16 RX ADMIN — Medication 62.5 MILLIMOLE(S): at 16:22

## 2022-07-16 RX ADMIN — Medication 1 MILLIGRAM(S): at 12:43

## 2022-07-16 RX ADMIN — ESCITALOPRAM OXALATE 20 MILLIGRAM(S): 10 TABLET, FILM COATED ORAL at 12:58

## 2022-07-16 NOTE — DISCHARGE NOTE PROVIDER - NSDCMRMEDTOKEN_GEN_ALL_CORE_FT
aspirin 81 mg oral tablet, chewable: 1 tab(s) orally once a day  atorvastatin 40 mg oral tablet: 1 tab(s) orally once a day  ESCITALOPRAM 20 MG TABLET: TAKE 1 TABLET BY MOUTH EVERY DAY  Plavix 75 mg oral tablet: 1 tab(s) orally once a day   amLODIPine 5 mg oral tablet: 1 tab(s) orally once a day  aspirin 325 mg oral tablet: 1 tab(s) orally once a day  atorvastatin 80 mg oral tablet: 1 tab(s) orally once a day (at bedtime)  Benadryl 25 mg oral tablet: orally once a day (at bedtime)  ESCITALOPRAM 20 MG TABLET: TAKE 1 TABLET BY MOUTH EVERY DAY  Plavix 75 mg oral tablet: 1 tab(s) orally once a day  Super Theravite-M oral tablet: 1 tab(s) orally once a day  thiamine 100 mg oral tablet: 1 tab(s) orally once a day   amLODIPine 5 mg oral tablet: 1 tab(s) orally once a day  aspirin 325 mg oral tablet: 1 tab(s) orally once a day  atorvastatin 80 mg oral tablet: 1 tab(s) orally once a day (at bedtime)  Benadryl 25 mg oral tablet: orally once a day (at bedtime)  cyanocobalamin 1000 mcg oral tablet: 1 tab(s) orally once a day  ESCITALOPRAM 20 MG TABLET: 1 tab(s) orally once a day  FeroSul 325 mg (65 mg elemental iron) oral tablet: 1 tab(s) orally 3 times a week, Mon, Wed, Fri  folic acid 1 mg oral tablet: 1 tab(s) orally once a day  nicotine 21 mg/24 hr transdermal film, extended release: 1 patch transdermal once a day  Plavix 75 mg oral tablet: 1 tab(s) orally once a day  polyethylene glycol 3350 oral powder for reconstitution: 17 gram(s) orally every 24 hours  Protonix 40 mg oral delayed release tablet: 1 tab(s) orally once a day  Super Theravite-M oral tablet: 1 tab(s) orally once a day  thiamine 100 mg oral tablet: 1 tab(s) orally once a day

## 2022-07-16 NOTE — PROGRESS NOTE ADULT - ASSESSMENT
58 y/o M with PMH of active cigarette smoking (46 pack-year history, 1 PPD), polysubstance use (heroine intranasal, alcohol, and K2 last use June 2nd), PTSD, depression on Lexapro, and stroke (4 months ago w/ R sided residual weakness, walks with a cane) who presents to the ED s/p syncopal episode after standing up abruptly. Orthostatics positive in ED. Cardiac & neurologic workup has been negative. Labs are significant for hypochromic normocytic anemia, decreased total protein & albumin, and negative FOBT. Pt is currently admitted to [facility] [place], hemodynamically stable and s/p 2 units PRBCs with Hgb improved to 9.0 from 5.6

## 2022-07-16 NOTE — DISCHARGE NOTE PROVIDER - NSDCCPCAREPLAN_GEN_ALL_CORE_FT
PRINCIPAL DISCHARGE DIAGNOSIS  Diagnosis: Symptomatic anemia  Assessment and Plan of Treatment: Anemia is a low number of red blood cells or a low amount of hemoglobin in your red blood cells. Hemoglobin is a protein that helps carry oxygen throughout your body. Red blood cells use iron to create hemoglobin. Anemia may develop if your body does not have enough iron. It may also develop if your body does not make enough red blood cells or they die faster than your body can make them.  What increases my risk for anemia?   •Trauma or surgery   •A gastrointestinal bleed  •A family history of blood disease or anemia  •Liver or kidney disease, cancer, rheumatoid arthritis, or hyperthyroidism  •Alcohol abuse  •Lack of foods that contain iron, folic acid, or vitamin B12  What are the signs and symptoms of anemia?   •Chest pain or a fast heartbeat  •Lightheadedness, dizziness, or shortness of breath  •Cold or pale skin  •Tiredness, weakness, or confusion  How is anemia diagnosed? Blood tests will show if you have anemia.  How is anemia treated? Treatment depends on the type of anemia you have.  •Iron or folic acid supplements help increase your red blood cell and hemoglobin levels.  •Vitamin B12 injections may help boost your red blood cell count and decrease your symptoms.  •A blood transfusion may be needed if your body cannot replace the blood you have lost.  •Surgery may be needed to stop bleeding.  How can I prevent anemia? Eat healthy foods rich in iron and vitamin C. Nuts, meat, dark leafy green vegetables, and beans are high in iron and protein. Vitamin C helps your body absorb iron. Foods rich in vitamin C include oranges and other citrus fruits. Ask your healthcare provider for a list of other foods that are high in iron or vitamin C. Ask if you need to be on a special diet.  Call your local emergency number (911 in the [place]), or have someone call if:   •You lose consciousness.  •You have severe chest pain.  When should I seek immediate care?   •You have dark or bloody bowel movements.         PRINCIPAL DISCHARGE DIAGNOSIS  Diagnosis: Symptomatic anemia  Assessment and Plan of Treatment: Anemia means low red blood cells or low hemoglobin in your red blood cells. Hemoglobin is a protein that helps carry oxygen throughout your body. Red blood cells use iron to create hemoglobin. Anemia may develop if your body does not have enough iron. It may also develop if your body does not make enough red blood cells or they die faster than your body can make them.  What increases my risk for anemia?   - Trauma or surgery   - A gastrointestinal bleed  - A family history of blood disease or anemia  - Liver or kidney disease, cancer, rheumatoid arthritis, or hyperthyroidism  - Alcohol abuse  - Lack of foods that contain iron, folic acid, or vitamin B12  What are the signs and symptoms of anemia?   - Chest pain or a fast heartbeat  - Lightheadedness, dizziness, or shortness of breath  - Cold or pale skin  - Tiredness, weakness, or confusion  How is anemia diagnosed?   - Blood tests will show if you have anemia.  How is anemia treated?   - Treatment depends on the type of anemia you have.  - Iron or folic acid supplements help increase your red blood cell and hemoglobin levels.  - Vitamin B12 injections may help boost your red blood cell count and decrease your symptoms.  - A blood transfusion may be needed if your body cannot replace the blood you have lost.  - Surgery may be needed to stop bleeding.  How can I prevent anemia?   - Eat healthy foods rich in iron and vitamin C. Nuts, meat, dark leafy green vegetables, and beans are high in iron and protein. Vitamin C helps your body absorb iron. Foods rich in vitamin C include oranges and other citrus fruits.  Call 911 or have someone call 911 for you if:  - You lose consciousness.  - You have severe chest pain.  Seek immediate care if:  - You have dark or bloody bowel movements.         PRINCIPAL DISCHARGE DIAGNOSIS  Diagnosis: Symptomatic anemia  Assessment and Plan of Treatment: Anemia means low red blood cells (RBCs) or low hemoglobin in your RBCs. Anemia may develop if your body does not have enough iron or other nutrients. It may also develop if your body does not make enough RBCs or if they die faster than your body can make them. Risk factors for anemia include trauma, surgery, gastrointestinal bleeding, a family history of anemia, liver or kidney disease, cancer, alcohol abuse, or lack of foods that contain iron, folic acid, or vitamin B12. Symptoms of anemia include chest pain or a fast heartbeat, lightheadedness, dizziness, shortness of breath, cold or pale skin, tiredness, weakness, or confusion. Anemia is diagnosed with blood tests. Treatment for anemia depends on the type of anemia you have. Iron, folic acid, or B12 supplements can help increase your RBC or hemoglobin levels. A blood transfusion may be needed if your body cannot replace the blood you have lost. Surgery may be needed to stop bleeding in severe cases. Anemia can be prevented by eating healthy foods rich in iron and vitamin C (nuts, meat, dark leafy vegetables, beans, oranges, other citrus fruits). Call 911 if you lose consciousness or have severe chest pain. Seek immediate care if you have dark or bloody bowel movements.  Things to follow up on:  - Please see your GI doctor as an outpatient in order to schedule a colonoscopy and upper endoscopy  - Please follow-up with your primary care provider to re-check your hemoglobin levels  - Please follow-up with your primary care provider for a low-dose CT scan of the chest for lung cancer screening  - Please resume all the medications you were taking prior to your hospitalization.

## 2022-07-16 NOTE — DISCHARGE NOTE PROVIDER - CARE PROVIDER_API CALL
Lucía Steele)  Internal Medicine  [address] [place] [address]  Phone: [phone]  Fax: [phone]  Follow Up Time: 1 week    Solis Luna)  Gastroenterology; Internal Medicine  [address] [place] [address]  Phone: [phone]  Fax: [phone]  Follow Up Time: 1 week

## 2022-07-16 NOTE — DISCHARGE NOTE PROVIDER - CARE PROVIDERS DIRECT ADDRESSES
[email or web address]

## 2022-07-16 NOTE — DISCHARGE NOTE PROVIDER - HOSPITAL COURSE
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 Pt states he was getting up from sitting down, felt lightheaded/dizzy and lost consciousness for a few seconds. Pt immediately returned to baseline. Pt states this has never happened before aside from his stroke. In the ED, pt was found to have positive orthostats with a Hgb of 5.6. Cardiac & neurologic workup were negative. Pt was admitted for workup and treatment of symptomatic anemia.      Problem List/Main Diagnoses (system-based):  #Syncope: Likely 2/2 symptomatic anemia. Normal cardiac & neuro workup. On discharge patient did not have orthostatic hypotension.    #Symptomatic anemia: Hgb 5.6 on admission. Improved to 9.0 after 2U PRBCs. Pt also received iron infusion. AC was held during admission. GI recommended outpatient scope.    #CVA: CT head on admission showed no new stroke. AC held 2/2 anemia. Pt received SCDs for DVT ppx/ Pt was discharged with home meds.    #Drug abuse: Hx of heroin, K2, EtOH, and tobacco use. States he has not used drugs since 6/2/22. Did not experience alcohol or opiate withdrawal during hospital stay. Received thiamine, B12, and folate supplementation.    #PTSD: Continued home med lexapro 20 mg PO daily       Inpatient treatment course: 2u PRBCs, B12, folic acid, lexapro 20 mg PO daily, pantoprazole 40 mg IV BID       Patient was discharged to: Shelter        New medications: None    Changes to old medications: None    Medications that were stopped: None       Items to follow up as outpatient:  - Follow-up with GI for outpatient scope        Physical exam at the time of discharge:  General: NAD, A&Ox4, sitting up comfortably in bed, speaking full sentences  HEENT: PERRL, EOMI, anicteric sclera; MMM, +conjunctival pallor  Neck: no JVD  Cardiovascular: Normal S1/S2; RRR; no M/R/G  Respiratory: no increased work of breathing; CTA B/L; no W/R/R  Gastrointestinal: soft, NT/ND; no rebound tenderness/guarding  Extremities: WWP; no edema, clubbing or cyanosis  Vascular: 2+ radial, DP/PT pulses B/L  Neurological: strength 4/5 RLE #Discharge: do not delete    Patient is a 57 M w/ PMH drug abuse (heroin intranasal, K2, 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 Pt states he was getting up from sitting down, felt lightheaded/dizzy and lost consciousness for a few seconds. Pt immediately returned to baseline. Pt states this has never happened before aside from his stroke. In the ED, pt was found to have positive orthostats with a Hgb of 5.6. Cardiac & neurologic workup were negative. Pt was admitted for workup and treatment of symptomatic anemia.      Problem List/Main Diagnoses (system-based):  #Syncope: Likely 2/2 symptomatic anemia. Normal cardiac & neuro workup. On discharge patient did not have orthostatic hypotension.    #Symptomatic anemia: Hgb 5.6 on admission. Improved to 9.3 after 2U PRBCs. Pt also received iron infusion. AC was held during admission. GI recommended outpatient scope.    #CVA: CT head on admission showed no new stroke. AC held 2/2 anemia. Pt received SCDs for DVT ppx/ Pt was discharged with home meds.    #Drug abuse: Hx of heroin, K2, EtOH, and tobacco use. States he has not used drugs since 6/2/22. Did not experience alcohol or opiate withdrawal during hospital stay. Received thiamine, B12, and folate supplementation.    #PTSD: Continued home med lexapro 20 mg PO daily       Inpatient treatment course: 2u PRBCs, iron supplementation, B12, folic acid, lexapro 20 mg PO daily, pantoprazole 40 mg IV BID       Patient was discharged to: Shelter        New medications: None    Changes to old medications: None    Medications that were stopped: None       Items to follow up as outpatient:  - Follow-up with GI for outpatient scope        Physical exam at the time of discharge:  General: NAD, A&Ox4, sitting up comfortably in bed, speaking full sentences  HEENT: PERRL, EOMI, anicteric sclera; MMM, +conjunctival pallor  Neck: no JVD  Cardiovascular: Normal S1/S2; RRR; no M/R/G  Respiratory: no increased work of breathing; CTA B/L; no W/R/R  Gastrointestinal: soft, NT/ND; no rebound tenderness/guarding  Extremities: WWP; no edema, clubbing or cyanosis  Vascular: 2+ radial, DP/PT pulses B/L  Neurological: strength 4/5 RLE #Discharge: do not delete    Patient is a 57 M w/ PMH drug abuse (heroin intranasal, K2, 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 Pt states he was getting up from sitting down, felt lightheaded/dizzy and lost consciousness for a few seconds. Pt immediately returned to baseline. Pt states this has never happened before aside from his stroke. In the ED, pt was found to have positive orthostats with a Hgb of 5.6. Cardiac & neurologic workup were negative. Pt was admitted for workup and treatment of symptomatic anemia.      Problem List/Main Diagnoses (system-based):  #Syncope: Likely 2/2 symptomatic anemia. Normal cardiac & neuro workup. On discharge patient did not have orthostatic hypotension.    #Symptomatic anemia: Hgb 5.6 on admission. Improved to 9.3 after 2U PRBCs. Pt also received iron infusion. AC was held during admission and restarted on discharge. GI recommended outpatient scope.    #CVA: CT head on admission showed no new stroke. AC held while inpatient 2/2 anemia. Pt received SCDs for DVT ppx. Pt was discharged with home meds.    #Drug abuse: Hx of heroin, K2, EtOH, and tobacco use. States he has not used drugs since 6/2/22. Did not experience alcohol or opiate withdrawal during hospital stay. Received thiamine, B12, and folate supplementation.    #PTSD: Continued home med lexapro 20 mg PO daily       Inpatient treatment course: 2u PRBCs, iron supplementation, B12, folic acid, lexapro 20 mg PO daily, pantoprazole 40 mg IV BID       Patient was discharged to: Shelter        New medications: None    Changes to old medications: None    Medications that were stopped: None       Items to follow up as outpatient:  - Follow-up with GI for outpatient scope        Physical exam at the time of discharge:  General: NAD, A&Ox4, sitting up comfortably in bed, speaking full sentences  HEENT: PERRL, EOMI, anicteric sclera; MMM, +conjunctival pallor  Neck: no JVD  Cardiovascular: Normal S1/S2; RRR; no M/R/G  Respiratory: no increased work of breathing; CTA B/L; no W/R/R  Gastrointestinal: soft, NT/ND; no rebound tenderness/guarding  Extremities: WWP; no edema, clubbing or cyanosis  Vascular: 2+ radial, DP/PT pulses B/L  Neurological: strength 4/5 RLE

## 2022-07-16 NOTE — DISCHARGE NOTE PROVIDER - PROVIDER TOKENS
PROVIDER:[TOKEN:[[id number]],FOLLOWUP:[1 week]],PROVIDER:[TOKEN:[[id number]],FOLLOWUP:[1 week]]

## 2022-07-16 NOTE — PROGRESS NOTE ADULT - PROBLEM SELECTOR PLAN 2
Found to have Hg of 5.6, normocytic (MCV 91). Orthostatics + at [facility]. No signs of acute bleed on ROS> likely iron def anemia in the setting of recently starting blood thinners after stroke 4 months ago. Iron studies not received prior to blood transfusion. Iron 28 (low), TIBC 249 (wnl), transferrin 216 (wnl), ferritin 6 (low). FOBT negative on admission. Tbili wnl (0.3). Now s/p 2u PRBC with Hgb improving to 9.0  -Iron infusion  -advance to regular diet, outpatient scope, per GI consult recs  -holding AC  -Protonix 40 mg IV BID.  -transfuse if Hgb < 7

## 2022-07-16 NOTE — DISCHARGE NOTE PROVIDER - DETAILS OF MALNUTRITION DIAGNOSIS/DIAGNOSES
This patient has been assessed with a concern for Malnutrition and was treated during this hospitalization for the following Nutrition diagnosis/diagnoses:     -  07/15/2022: Mild protein-calorie malnutrition

## 2022-07-16 NOTE — DISCHARGE NOTE PROVIDER - NSDCFUSCHEDAPPT_GEN_ALL_CORE_FT
[facility]  GASTRO [address]  Scheduled Appointment: 08/16/2022     [facility]  INTMED [address]  Scheduled Appointment: 07/25/2022    Solis Luna  [facility]  GASTRO [address]  Scheduled Appointment: 08/16/2022

## 2022-07-16 NOTE — PROGRESS NOTE ADULT - SUBJECTIVE AND OBJECTIVE BOX
Patient is a 57y old  Male who presents with a chief complaint of Syncope (15 Jul 2022 13:03)      INTERVAL HPI/OVERNIGHT EVENTS:  No acute events overnight. Pt is currently feeling well. Denies any dizziness, vision changes, chest pain, palpitations, abdominal pain, SOB, vision changes, or new focal muscle weakness or numbness. Denies nausea/vomiting, fever/chills, hematuria, dysuria, hematemesis, or hematochezia. Denies any recent weight changes or anorexia.    VITAL SIGNS:  T(C): 37.1 (07-16-22 @ 15:45), Max: 37.1 (07-16-22 @ 15:45)  HR: 73 (07-16-22 @ 15:45) (71 - 80)  BP: 120/77 (07-16-22 @ 15:45) (106/69 - 120/77)  RR: 18 (07-16-22 @ 15:45) (18 - 19)  SpO2: 98% (07-16-22 @ 15:45) (94% - 98%)    Parameters below as of 16 Jul 2022 15:45  Patient On (Oxygen Delivery Method): room air    ALLERGIES:  penicillin (Unknown)      PHYSICAL EXAM:  General: NAD, A&Ox4, sitting up comfortably in bed, speaking full sentences  HEENT: PERRL, EOMI, anicteric sclera; MMM, +conjunctival pallor  Neck: no JVD  Cardiovascular: Normal S1/S2; RRR; no M/R/G  Respiratory: no increased work of breathing; CTA B/L; no W/R/R  Gastrointestinal: soft, NT/ND; no rebound tenderness/guarding  Extremities: WWP; no edema, clubbing or cyanosis  Vascular: 2+ radial, DP/PT pulses B/L  Neurological: strength 4/5 RLE     Consultant(s) Notes Reviewed:  [x ] YES  [ ] NO  Care Discussed with Consultants/Other Providers [ x] YES  [ ] NO    LABS:  LABS:                        9.0    10.44 )-----------( 422      ( 16 Jul 2022 09:33 )             28.4     07-16    140  |  107  |  16  ----------------------------<  116<H>  4.4   |  25  |  0.91    Ca    9.0      16 Jul 2022 09:33  Phos  2.0     07-16  Mg     2.0     07-16    TPro  5.9<L>  /  Alb  3.8  /  TBili  0.3  /  DBili  x   /  AST  12  /  ALT  14  /  AlkPhos  72  07-16        RADIOLOGY, EKG AND ADDITIONAL TESTS: Reviewed.    INPATIENT MEDICATIONS:  cyanocobalamin 1000 MICROGram(s) Oral daily  escitalopram 20 milliGRAM(s) Oral daily  folic acid 1 milliGRAM(s) Oral daily  LORazepam   Injectable 2 milliGRAM(s) IV Push once PRN  multivitamin 1 Tablet(s) Oral daily  nicotine - 21 mG/24Hr(s) Patch 1 Patch Transdermal daily  pantoprazole  Injectable 40 milliGRAM(s) IV Push every 12 hours

## 2022-07-16 NOTE — DISCHARGE NOTE PROVIDER - NSDCHC_MEDRECSTATUS_GEN_ALL_CORE
Admission Reconciliation is Completed  Discharge Reconciliation is Completed Admission Reconciliation is Completed  Discharge Reconciliation is Not Complete Admission Reconciliation is Not Complete  Discharge Reconciliation is Not Complete

## 2022-07-16 NOTE — PROGRESS NOTE ADULT - PROBLEM SELECTOR PLAN 1
Patient w/ reported syncope lasting a few seconds that occurred on abruptly standing up. No post ictal state, immediately returned back to baseline. Likely 2/2 to symptomatic anemia. Found to have Hg of 5.6. Orthostatics + at [facility]. Patient denies prodromal symptoms. EKG unremarkable. CT head w/o new stroke or intracranial hemorrhage. No hx of witnessed seizure activity. TSH wnl (0.961).   -f/u repeat orthostatics - orthostatics 127/87 lying down, 125/83 sitting, 117/78 standing   -plan as below.

## 2022-07-17 LAB
HCT VFR BLD CALC: 29.9 % — LOW (ref 39–50)
HGB BLD-MCNC: 9.3 G/DL — LOW (ref 13–17)
MCHC RBC-ENTMCNC: 26.1 PG — LOW (ref 27–34)
MCHC RBC-ENTMCNC: 31.1 GM/DL — LOW (ref 32–36)
MCV RBC AUTO: 83.8 FL — SIGNIFICANT CHANGE UP (ref 80–100)
NRBC # BLD: 0 /100 WBCS — SIGNIFICANT CHANGE UP (ref 0–0)
PLATELET # BLD AUTO: 362 K/UL — SIGNIFICANT CHANGE UP (ref 150–400)
RBC # BLD: 3.57 M/UL — LOW (ref 4.2–5.8)
RBC # FLD: 19.9 % — HIGH (ref 10.3–14.5)
WBC # BLD: 10.94 K/UL — HIGH (ref 3.8–10.5)
WBC # FLD AUTO: 10.94 K/UL — HIGH (ref 3.8–10.5)

## 2022-07-17 PROCEDURE — 99232 SBSQ HOSP IP/OBS MODERATE 35: CPT

## 2022-07-17 RX ORDER — POLYETHYLENE GLYCOL 3350 17 G/17G
17 POWDER, FOR SOLUTION ORAL EVERY 24 HOURS
Refills: 0 | Status: DISCONTINUED | OUTPATIENT
Start: 2022-07-17 | End: 2022-07-18

## 2022-07-17 RX ORDER — ATORVASTATIN CALCIUM 80 MG/1
40 TABLET, FILM COATED ORAL AT BEDTIME
Refills: 0 | Status: DISCONTINUED | OUTPATIENT
Start: 2022-07-17 | End: 2022-07-18

## 2022-07-17 RX ORDER — FERROUS SULFATE 325(65) MG
325 TABLET ORAL DAILY
Refills: 0 | Status: DISCONTINUED | OUTPATIENT
Start: 2022-07-17 | End: 2022-07-18

## 2022-07-17 RX ORDER — SENNA PLUS 8.6 MG/1
1 TABLET ORAL DAILY
Refills: 0 | Status: DISCONTINUED | OUTPATIENT
Start: 2022-07-17 | End: 2022-07-18

## 2022-07-17 RX ADMIN — ATORVASTATIN CALCIUM 40 MILLIGRAM(S): 80 TABLET, FILM COATED ORAL at 22:18

## 2022-07-17 RX ADMIN — Medication 325 MILLIGRAM(S): at 11:30

## 2022-07-17 RX ADMIN — Medication 1 MILLIGRAM(S): at 08:53

## 2022-07-17 RX ADMIN — Medication 1 TABLET(S): at 08:53

## 2022-07-17 RX ADMIN — ESCITALOPRAM OXALATE 20 MILLIGRAM(S): 10 TABLET, FILM COATED ORAL at 08:53

## 2022-07-17 RX ADMIN — PREGABALIN 1000 MICROGRAM(S): 225 CAPSULE ORAL at 08:53

## 2022-07-17 NOTE — PROGRESS NOTE ADULT - PROBLEM SELECTOR PLAN 2
Found to have Hg of 5.6, normocytic (MCV 91). Orthostatics + at [facility]. No signs of acute bleed on ROS> likely iron def anemia in the setting of recently starting blood thinners after stroke 4 months ago. Iron studies not received prior to blood transfusion. Iron 28 (low), TIBC 249 (wnl), transferrin 216 (wnl), ferritin 6 (low). FOBT negative on admission. Tbili wnl (0.3). Now s/p 2u PRBC with Hgb improving to 9.0  -Iron infusion  -advance to regular diet, outpatient scope, per GI consult recs  -holding AC  -Protonix 40 mg IV BID.  -transfuse if Hgb < 7

## 2022-07-17 NOTE — PROGRESS NOTE ADULT - PROBLEM SELECTOR PLAN 1
Patient w/ reported syncope lasting a few seconds that occurred on abruptly standing up. No post ictal state, immediately returned back to baseline. Likely 2/2 to symptomatic anemia. Found to have Hg of 5.6. Orthostatics + at [facility]. Patient denies prodromal symptoms. EKG unremarkable. CT head w/o new stroke or intracranial hemorrhage. No hx of witnessed seizure activity. TSH wnl (0.961).   -f/u repeat orthostatics - orthostatics 127/87 lying down, 125/83 sitting, 117/78 standing   -plan as below.

## 2022-07-17 NOTE — PROGRESS NOTE ADULT - PROBLEM SELECTOR PLAN 1
Patient w/ reported syncope lasting a few seconds that occurred on abruptly standing up. No post ictal state, immediately returned back to baseline. Likely 2/2 to symptomatic anemia. Found to have Hg of 5.6. [facility] Patient denies prodromal symptoms. EKG unremarkable. CT head w/o new stroke or intracranial hemorrhage. No hx of witnessed seizure activity. TSH wnl (0.961).   -Stable/resolve. All 2/2 anemia.

## 2022-07-17 NOTE — PROGRESS NOTE ADULT - SUBJECTIVE AND OBJECTIVE BOX
SUBJECTIVE:  Patient seen and examined at bedside. No acute complaints. No overnight events. ROS negative. States he's ready to go home. No dark BM. NO hematuria. No hematemesis.     Vital Signs Last 12 Hrs  T(F): 98.3 (07-17-22 @ 04:58), Max: 98.3 (07-17-22 @ 04:58)  HR: 71 (07-17-22 @ 04:58) (71 - 71)  BP: 130/86 (07-17-22 @ 04:58) (130/86 - 130/86)  BP(mean): --  RR: 16 (07-17-22 @ 04:58) (16 - 16)  SpO2: 97% (07-17-22 @ 04:58) (97% - 97%)  I&O's Summary    17 Jul 2022 07:01  -  17 Jul 2022 11:34  --------------------------------------------------------  IN: 0 mL / OUT: 400 mL / NET: -400 mL        PHYSICAL EXAM:  Constitutional: NAD, comfortable in bed.  HEENT: EOMI, MM Moist  Neck: Supple, no JVD  Respiratory: CTA B/L.   Cardiovascular: RRR, normal S1 and S2  Gastrointestinal: +BS, soft NTND  Extremities: no edema.   Neurological: AAOx3        LABS:                        9.3    10.94 )-----------( 362      ( 17 Jul 2022 05:30 )             29.9     07-16    140  |  107  |  16  ----------------------------<  116<H>  4.4   |  25  |  0.91    Ca    9.0      16 Jul 2022 09:33  Phos  2.0     07-16  Mg     2.0     07-16    TPro  5.9<L>  /  Alb  3.8  /  TBili  0.3  /  DBili  x   /  AST  12  /  ALT  14  /  AlkPhos  72  07-16            RADIOLOGY & ADDITIONAL TESTS:    MEDICATIONS  (STANDING):  atorvastatin 40 milliGRAM(s) Oral at bedtime  cyanocobalamin 1000 MICROGram(s) Oral daily  escitalopram 20 milliGRAM(s) Oral daily  ferrous    sulfate 325 milliGRAM(s) Oral daily  folic acid 1 milliGRAM(s) Oral daily  multivitamin 1 Tablet(s) Oral daily  nicotine - 21 mG/24Hr(s) Patch 1 Patch Transdermal daily  pantoprazole  Injectable 40 milliGRAM(s) IV Push every 12 hours  polyethylene glycol 3350 17 Gram(s) Oral every 24 hours  senna 1 Tablet(s) Oral daily    MEDICATIONS  (PRN):  LORazepam   Injectable 2 milliGRAM(s) IV Push once PRN CIWA-Ar score increase by 2 points and a total score of 7 or less

## 2022-07-17 NOTE — PROGRESS NOTE ADULT - SUBJECTIVE AND OBJECTIVE BOX
OVERNIGHT EVENTS: no acute events    SUBJECTIVE / INTERVAL HPI: Patient seen and examined at bedside. Patient is doing well and denies any further residual weakness. He denies any other complaints at this time.    Patient denies fevers, chills, headaches,     VITAL SIGNS:  Vital Signs Last 24 Hrs  T(C): 36.8 (17 Jul 2022 04:58), Max: 37.2 (16 Jul 2022 21:13)  T(F): 98.3 (17 Jul 2022 04:58), Max: 99 (16 Jul 2022 21:13)  HR: 71 (17 Jul 2022 04:58) (69 - 73)  BP: 130/86 (17 Jul 2022 04:58) (114/72 - 130/86)  BP(mean): --  RR: 16 (17 Jul 2022 04:58) (16 - 18)  SpO2: 97% (17 Jul 2022 04:58) (95% - 98%)    Parameters below as of 17 Jul 2022 04:58  Patient On (Oxygen Delivery Method): room air        PHYSICAL EXAM:    General: NAD  HEENT: NC/AT; PERRL, anicteric sclera; MMM  Neck: supple  Cardiovascular: +S1/S2; RRR  Respiratory: CTA B/L; no W/R/R  Gastrointestinal: soft, NT/ND; +BSx4  Extremities: WWP; no edema, clubbing or cyanosis  Vascular: 2+ radial, DP/PT pulses B/L  Neurological: AAOx3; no focal deficits    MEDICATIONS:  MEDICATIONS  (STANDING):  atorvastatin 40 milliGRAM(s) Oral at bedtime  cyanocobalamin 1000 MICROGram(s) Oral daily  escitalopram 20 milliGRAM(s) Oral daily  ferrous    sulfate 325 milliGRAM(s) Oral daily  folic acid 1 milliGRAM(s) Oral daily  multivitamin 1 Tablet(s) Oral daily  nicotine - 21 mG/24Hr(s) Patch 1 Patch Transdermal daily  pantoprazole  Injectable 40 milliGRAM(s) IV Push every 12 hours  polyethylene glycol 3350 17 Gram(s) Oral every 24 hours  senna 1 Tablet(s) Oral daily    MEDICATIONS  (PRN):  LORazepam   Injectable 2 milliGRAM(s) IV Push once PRN CIWA-Ar score increase by 2 points and a total score of 7 or less      ALLERGIES:  Allergies    penicillin (Unknown)    Intolerances        LABS:                        9.3    10.94 )-----------( 362      ( 17 Jul 2022 05:30 )             29.9     07-16    140  |  107  |  16  ----------------------------<  116<H>  4.4   |  25  |  0.91    Ca    9.0      16 Jul 2022 09:33  Phos  2.0     07-16  Mg     2.0     07-16    TPro  5.9<L>  /  Alb  3.8  /  TBili  0.3  /  DBili  x   /  AST  12  /  ALT  14  /  AlkPhos  72  07-16        CAPILLARY BLOOD GLUCOSE          RADIOLOGY & ADDITIONAL TESTS: Reviewed.

## 2022-07-17 NOTE — PROGRESS NOTE ADULT - ASSESSMENT
58 y/o M with PMH of active cigarette smoking (46 pack-year history, 1 PPD), polysubstance use (heroine intranasal, alcohol, and K2 last use June 2nd), PTSD, depression on Lexapro, and stroke (4 months ago w/ R sided residual weakness, walks with a cane) who presents to the ED s/p syncopal episode after standing up abruptly. Orthostatics positive in ED. Cardiac & neurologic workup has been negative. Labs are significant for hypochromic normocytic anemia, decreased total protein & albumin, and negative FOBT. Pt is currently admitted to [facility], hemodynamically stable and s/p 2 units PRBCs with Hgb improved to 9.0 from 5.6

## 2022-07-17 NOTE — PROGRESS NOTE ADULT - ASSESSMENT
56 y/o M with PMH of active cigarette smoking (46 pack-year history, 1 PPD), polysubstance use (heroine intranasal, alcohol, and K2 last use June 2nd), PTSD, depression on Lexapro, and stroke (4 months ago w/ R sided residual weakness, walks with a cane) who presents to the ED s/p syncopal episode after standing up abruptly. Orthostatics positive in ED. Cardiac & neurologic workup has been negative. Labs are significant for hypochromic normocytic anemia, decreased total protein & albumin, and negative FOBT. Pt is currently admitted to [facility], hemodynamically stable and s/p 2 units PRBCs with Hgb improved to 9.0 from 5.6

## 2022-07-18 ENCOUNTER — TRANSCRIPTION ENCOUNTER (OUTPATIENT)
Age: 57
End: 2022-07-18

## 2022-07-18 VITALS
OXYGEN SATURATION: 95 % | RESPIRATION RATE: 18 BRPM | HEART RATE: 65 BPM | SYSTOLIC BLOOD PRESSURE: 112 MMHG | TEMPERATURE: 98 F | DIASTOLIC BLOOD PRESSURE: 64 MMHG

## 2022-07-18 PROBLEM — F43.10 POST-TRAUMATIC STRESS DISORDER, UNSPECIFIED: Chronic | Status: ACTIVE | Noted: 2022-07-14

## 2022-07-18 PROBLEM — F19.10 OTHER PSYCHOACTIVE SUBSTANCE ABUSE, UNCOMPLICATED: Chronic | Status: ACTIVE | Noted: 2022-07-14

## 2022-07-18 PROCEDURE — 99239 HOSP IP/OBS DSCHRG MGMT >30: CPT

## 2022-07-18 RX ORDER — ESCITALOPRAM OXALATE 10 MG/1
0 TABLET, FILM COATED ORAL
Qty: 0 | Refills: 0 | DISCHARGE

## 2022-07-18 RX ORDER — FERROUS SULFATE 325(65) MG
1 TABLET ORAL
Qty: 30 | Refills: 0
Start: 2022-07-18

## 2022-07-18 RX ORDER — ASPIRIN/CALCIUM CARB/MAGNESIUM 324 MG
1 TABLET ORAL
Qty: 0 | Refills: 0 | DISCHARGE

## 2022-07-18 RX ORDER — AMLODIPINE BESYLATE 2.5 MG/1
1 TABLET ORAL
Qty: 30 | Refills: 0
Start: 2022-07-18 | End: 2022-08-16

## 2022-07-18 RX ORDER — NICOTINE POLACRILEX 2 MG
1 GUM BUCCAL
Qty: 0 | Refills: 0 | DISCHARGE
Start: 2022-07-18

## 2022-07-18 RX ORDER — PANTOPRAZOLE SODIUM 20 MG/1
1 TABLET, DELAYED RELEASE ORAL
Qty: 0 | Refills: 0 | DISCHARGE
Start: 2022-07-18

## 2022-07-18 RX ORDER — THIAMINE MONONITRATE (VIT B1) 100 MG
1 TABLET ORAL
Qty: 0 | Refills: 0 | DISCHARGE

## 2022-07-18 RX ORDER — DIPHENHYDRAMINE HCL 50 MG
0 CAPSULE ORAL
Qty: 0 | Refills: 0 | DISCHARGE

## 2022-07-18 RX ORDER — PREGABALIN 225 MG/1
1 CAPSULE ORAL
Qty: 0 | Refills: 0 | DISCHARGE
Start: 2022-07-18

## 2022-07-18 RX ORDER — POLYETHYLENE GLYCOL 3350 17 G/17G
17 POWDER, FOR SOLUTION ORAL
Qty: 0 | Refills: 0 | DISCHARGE
Start: 2022-07-18

## 2022-07-18 RX ORDER — FOLIC ACID 0.8 MG
1 TABLET ORAL
Qty: 30 | Refills: 0
Start: 2022-07-18 | End: 2022-08-16

## 2022-07-18 RX ORDER — CLOPIDOGREL BISULFATE 75 MG/1
1 TABLET, FILM COATED ORAL
Qty: 30 | Refills: 0
Start: 2022-07-18 | End: 2022-08-16

## 2022-07-18 RX ORDER — FERROUS SULFATE 325(65) MG
1 TABLET ORAL
Qty: 0 | Refills: 0 | DISCHARGE
Start: 2022-07-18

## 2022-07-18 RX ORDER — ATORVASTATIN CALCIUM 80 MG/1
1 TABLET, FILM COATED ORAL
Qty: 0 | Refills: 0 | DISCHARGE

## 2022-07-18 RX ORDER — AMLODIPINE BESYLATE 2.5 MG/1
1 TABLET ORAL
Qty: 0 | Refills: 0 | DISCHARGE

## 2022-07-18 RX ORDER — AMLODIPINE BESYLATE 2.5 MG/1
1 TABLET ORAL
Qty: 30 | Refills: 3
Start: 2022-07-18 | End: 2022-11-14

## 2022-07-18 RX ORDER — ESCITALOPRAM OXALATE 10 MG/1
1 TABLET, FILM COATED ORAL
Qty: 0 | Refills: 0 | DISCHARGE

## 2022-07-18 RX ORDER — CLOPIDOGREL BISULFATE 75 MG/1
1 TABLET, FILM COATED ORAL
Qty: 0 | Refills: 0 | DISCHARGE

## 2022-07-18 RX ORDER — FOLIC ACID 0.8 MG
1 TABLET ORAL
Qty: 0 | Refills: 0 | DISCHARGE
Start: 2022-07-18

## 2022-07-18 RX ADMIN — Medication 1 TABLET(S): at 11:04

## 2022-07-18 RX ADMIN — PREGABALIN 1000 MICROGRAM(S): 225 CAPSULE ORAL at 11:04

## 2022-07-18 RX ADMIN — Medication 1 MILLIGRAM(S): at 11:04

## 2022-07-18 RX ADMIN — ESCITALOPRAM OXALATE 20 MILLIGRAM(S): 10 TABLET, FILM COATED ORAL at 11:04

## 2022-07-18 RX ADMIN — Medication 325 MILLIGRAM(S): at 11:03

## 2022-07-18 NOTE — PROGRESS NOTE ADULT - PROBLEM SELECTOR PROBLEM 4
Patient reports frequent bleeding while on the Depo.  She has been receiving Depo injections since 12/18.  Patient states that she is bleeding more than she is not bleeding.  Currently she has been bleeding for 3 weeks.  Per Dr Sanders patient to have CPE in April.  Patient may have Sprintec to cover until that appointment.  
Drug abuse

## 2022-07-18 NOTE — PROGRESS NOTE ADULT - PROBLEM SELECTOR PLAN 1
Patient w/ reported syncope lasting a few seconds that occurred on abruptly standing up. No post ictal state, immediately returned back to baseline. Likely 2/2 to symptomatic anemia. Found to have Hg of 5.6 in ED. Orthostatics + at [facility]. Patient denies prodromal symptoms. EKG unremarkable. CT head w/o new stroke or intracranial hemorrhage. No hx of witnessed seizure activity. TSH wnl (0.961).   -Stable/resolve. All 2/2 anemia.

## 2022-07-18 NOTE — PROGRESS NOTE ADULT - PROBLEM SELECTOR PLAN 4
Hx of heroine use, K2 and etoh. utox + for methadone. patient denies being on methadone. states last use was 6/2. Denies hx of IVDU. denies hx of HIV or Hep C. [facility] 0 on arrival.   -[facility] protocol  -c/w thiamine, b12 folate supplementation
Hx of heroine use, K2 and etoh.  46 pack year smoking history. utox + for methadone. patient denies being on methadone. states last use was 6/2. Denies hx of IVDU. denies hx of HIV or Hep C. [facility] 0 on arrival.   -c/w thiamine, b12 folate supplementation  -nicotine patch PRN
Hx of heroine use, K2 and etoh.  46 pack year smoking history. utox + for methadone. patient denies being on methadone. states last use was 6/2. Denies hx of IVDU. denies hx of HIV or Hep C. [facility] 0 on arrival.   -c/w thiamine, b12 folate supplementation  -nicotine patch PRN
Hx of heroine use, K2 and etoh.  46 pack year smoking history. Utox + for methadone. Patient denies being on methadone. States last use was 6/2. Denies hx of IVDU. Denies hx of HIV or Hep C. [facility] 0 on arrival.   -c/w thiamine, B12, folate supplementation  -nicotine patch PRN
Hx of heroine use, K2 and etoh.  46 pack year smoking history. utox + for methadone. patient denies being on methadone. states last use was 6/2. Denies hx of IVDU. denies hx of HIV or Hep C. [facility] 0 on arrival.   -c/w thiamine, b12 folate supplementation  -nicotine patch PRN

## 2022-07-18 NOTE — PROGRESS NOTE ADULT - PROBLEM SELECTOR PLAN 6
Fluids: none  Electrolytes: Mg>2, K>4  Nutrition:  No IVF currently needed, replete lytes PRN  Prophylaxis: SCD  Activity: AAT, OOBTC  GI: none  C: FC  Dispo: [facility]
Fluids: none  Electrolytes: Mg>2, K>4  Nutrition:  No IVF currently needed, replete lytes PRN  Prophylaxis: SCD  Activity: AAT, OOBTC  GI: none  C: FC  Dispo: Admit to F
Fluids: none  Electrolytes: Mg>2, K>4  Nutrition:  No IVF currently needed, replete lytes PRN  Prophylaxis: SCD  Activity: AAT, OOBTC  GI: none  C: FC  Dispo: [facility]

## 2022-07-18 NOTE — DISCHARGE NOTE NURSING/CASE MANAGEMENT/SOCIAL WORK - NSDCPEFALRISK_GEN_ALL_CORE
For information on Fall & Injury Prevention, visit: [email or web address] OR  [email or web address] OR  https://www.cdc.gov/steadi/patient.html

## 2022-07-18 NOTE — PROGRESS NOTE ADULT - REASON FOR ADMISSION
Syncopal episode
residual weakness s/p syncopal episode
syncopal episode

## 2022-07-18 NOTE — DISCHARGE NOTE NURSING/CASE MANAGEMENT/SOCIAL WORK - PATIENT PORTAL LINK FT
You can access the FollowMyHealth Patient Portal offered by [facility] by registering at the following website: [email or web address]. By joining [employer]’s FollowMyHealth portal, you will also be able to view your health information using other applications (apps) compatible with our system.

## 2022-07-18 NOTE — PROGRESS NOTE ADULT - PROBLEM SELECTOR PROBLEM 3
CVA (cerebrovascular accident)

## 2022-07-18 NOTE — PROGRESS NOTE ADULT - PROBLEM SELECTOR PLAN 2
Found to have Hg of 5.6, normocytic (MCV 91). [facility] No signs of acute bleed on ROS> likely iron def anemia in the setting of recently starting blood thinners after stroke 4 months ago. Iron studies not received prior to blood transfusion. Iron 28 (low), TIBC 249 (wnl), transferrin 216 (wnl), ferritin 6 (low). FOBT negative on admission. Tbili wnl (0.3). Now s/p 2u PRBC with Hgb improving to 9.3.  -Giving PO iron for outpatient with bowel regimen  -GI no intervention acutely. Will f/u outpatient  -Continue to monitor hgb  -Will need outpt f/u especially if GI workup negative. Anemia workup being held given he was resuscitated with pRBCs, may confound results.  -c/w pantoprazole 40 mg q12h

## 2022-07-18 NOTE — PROGRESS NOTE ADULT - ASSESSMENT
58 y/o M with PMH of active cigarette smoking (46 pack-year history, 1 PPD), polysubstance use (heroine intranasal, alcohol, and K2 last use June 2nd), PTSD, depression on Lexapro, and stroke (4 months ago w/ R sided residual weakness, walks with a cane) who presents to the ED s/p syncopal episode after standing up abruptly. Orthostatics positive in ED. Cardiac & neurologic workup has been negative. Labs are significant for hypochromic normocytic anemia, decreased total protein & albumin, and negative FOBT. Pt is currently admitted to [facility], hemodynamically stable and s/p 2 units PRBCs with Hgb improved to 9.0 from 5.6

## 2022-07-18 NOTE — PROGRESS NOTE ADULT - PROBLEM SELECTOR PLAN 5
Patient states he has Hx of PTSD and depression. On Lexapro 20mg qd.  -c/w home med.
Patient states he has Hx of PTSD and depression. On Lexapro 20mg qd.  -c/w home med.
Patient states he has Hx of PTSD and depression. On Lexapro 20mg qd.  -c/w home med
Patient states he has Hx of PTSD and depression. On Lexapro 20mg qd.  -c/w home med.
Patient states he has Hx of PTSD and depression. On Lexapro 20mg qd.  -c/w home med.

## 2022-07-18 NOTE — PROGRESS NOTE ADULT - NUTRITIONAL ASSESSMENT
This patient has been assessed with a concern for Malnutrition and has been determined to have a diagnosis/diagnoses of Mild protein-calorie malnutrition.    This patient is being managed with:   Diet Regular-  Supplement Feeding Modality:  Oral  Ensure Enlive Cans or Servings Per Day:  1       Frequency:  Two Times a day  Entered: Jul 15 2022 12:21PM    

## 2022-07-18 NOTE — PROGRESS NOTE ADULT - PROBLEM SELECTOR PLAN 3
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 on asa, plavix and a blood thinner however patient cannot recall the name. CT head on admission w/o new stroke.   -No evidence he was on AC. He is on DUAP and statin  -Hold DUAP until discharge  -SCDs for dvt ppx
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 on asa, plavix and a blood thinner however patient cannot recall the name. CT head on admission w/o new stroke.   -restart AC when appropriate  -SCDs for dvt ppx.
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 on asa, plavix and a blood thinner however patient cannot recall the name. CT head on admission w/o new stroke.   -No evidence he was on AC. He is on DUAP and statin  -Hold DUAP until discharge  -SCDs for dvt ppx.
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 on asa, plavix and a blood thinner however patient cannot recall the name. CT head on admission w/o new stroke.   -restart AC when appropriate  -SCDs for dvt ppx
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 on asa, plavix and a blood thinner however patient cannot recall the name. CT head on admission w/o new stroke.   -restart AC when appropriate  -SCDs for dvt ppx.

## 2022-07-18 NOTE — PROGRESS NOTE ADULT - SUBJECTIVE AND OBJECTIVE BOX
SUBJECTIVE:  Patient seen and examined at bedside. Pt reports feeling well. He has been ambulating without any lightheadedness on standing. No acute complaints. No overnight events. States he's ready to go home. Had normal BM yesterday. No dark stools. No hematuria. No hematemesis. Denies any HA, dizziness, n/v, vision changes, CP, palpitations, abd pain, difficulty breathing, or leg pain. 12pt ROS negative.     Vital Signs Last 12 Hrs  Vital Signs Last 24 Hrs  T(C): 36.8 (18 Jul 2022 05:07), Max: 37.1 (17 Jul 2022 12:52)  T(F): 98.2 (18 Jul 2022 05:07), Max: 98.8 (17 Jul 2022 12:52)  HR: 65 (18 Jul 2022 05:07) (65 - 79)  BP: 112/64 (18 Jul 2022 05:07) (97/69 - 116/77)  BP(mean): --  RR: 18 (18 Jul 2022 05:07) (16 - 20)  SpO2: 95% (18 Jul 2022 05:07) (95% - 98%)    Parameters below as of 17 Jul 2022 20:55  Patient On (Oxygen Delivery Method): room air      PHYSICAL EXAM:  Constitutional: NAD, sitting comfortably in bed.  HEENT: EOMI, MM, anicteric sclera  Neck: Supple, no JVD  Respiratory: CTA B/L, no W/R/R  Cardiovascular: RRR, normal S1 and S2, no M/R/G  Gastrointestinal: normoactive BS x 4, soft NTND  Extremities: WWP, no edema, DP/PT pulses 2+  Neurological: AAOx3    LABS:                        9.3    10.94 )-----------( 362      ( 17 Jul 2022 05:30 )             29.9     07-16    140  |  107  |  16  ----------------------------<  116<H>  4.4   |  25  |  0.91    Ca    9.0      16 Jul 2022 09:33  Phos  2.0     07-16  Mg     2.0     07-16    TPro  5.9<L>  /  Alb  3.8  /  TBili  0.3  /  DBili  x   /  AST  12  /  ALT  14  /  AlkPhos  72  07-16      RADIOLOGY: Reviewed.    MEDICATIONS  (STANDING):  atorvastatin 40 milliGRAM(s) Oral at bedtime  cyanocobalamin 1000 MICROGram(s) Oral daily  escitalopram 20 milliGRAM(s) Oral daily  ferrous    sulfate 325 milliGRAM(s) Oral daily  folic acid 1 milliGRAM(s) Oral daily  multivitamin 1 Tablet(s) Oral daily  nicotine - 21 mG/24Hr(s) Patch 1 Patch Transdermal daily  pantoprazole  Injectable 40 milliGRAM(s) IV Push every 12 hours  polyethylene glycol 3350 17 Gram(s) Oral every 24 hours  senna 1 Tablet(s) Oral daily    MEDICATIONS  (PRN):  LORazepam   Injectable 2 milliGRAM(s) IV Push once PRN CIWA-Ar score increase by 2 points and a total score of 7 or less   needs device and assist SUBJECTIVE:  Patient seen and examined at bedside. Pt reports feeling well. Tolerating regular diet. Pt has been ambulating without any lightheadedness or weakness on standing. No acute complaints. No overnight events. States he's ready to go home. Had normal BM yesterday. No dark stools. No hematuria. No hematemesis. Denies any HA, dizziness, n/v, vision changes, CP, palpitations, abd pain, shortness of breath, or leg pain. 12pt ROS negative.     Vital Signs Last 24 Hrs  T(C): 36.8 (18 Jul 2022 05:07), Max: 37.1 (17 Jul 2022 12:52)  T(F): 98.2 (18 Jul 2022 05:07), Max: 98.8 (17 Jul 2022 12:52)  HR: 65 (18 Jul 2022 05:07) (65 - 79)  BP: 112/64 (18 Jul 2022 05:07) (97/69 - 116/77)  BP(mean): --  RR: 18 (18 Jul 2022 05:07) (16 - 20)  SpO2: 95% (18 Jul 2022 05:07) (95% - 98%)    Parameters below as of 17 Jul 2022 20:55  Patient On (Oxygen Delivery Method): room air    PHYSICAL EXAM:  Constitutional: NAD, sitting comfortably in bed.  HEENT: EOMI, MM, anicteric sclera  Neck: Supple, no JVD  Respiratory: CTA B/L, no W/R/R  Cardiovascular: RRR, normal S1 and S2, no M/R/G  Gastrointestinal: normoactive BS x 4, soft NTND  Extremities: WWP, no edema, DP/PT pulses 2+  Neurological: AAOx3    LABS:                        9.3    10.94 )-----------( 362      ( 17 Jul 2022 05:30 )             29.9     07-16    140  |  107  |  16  ----------------------------<  116<H>  4.4   |  25  |  0.91    Ca    9.0      16 Jul 2022 09:33  Phos  2.0     07-16  Mg     2.0     07-16    TPro  5.9<L>  /  Alb  3.8  /  TBili  0.3  /  DBili  x   /  AST  12  /  ALT  14  /  AlkPhos  72  07-16      RADIOLOGY: Reviewed.    MEDICATIONS  (STANDING):  atorvastatin 40 milliGRAM(s) Oral at bedtime  cyanocobalamin 1000 MICROGram(s) Oral daily  escitalopram 20 milliGRAM(s) Oral daily  ferrous    sulfate 325 milliGRAM(s) Oral daily  folic acid 1 milliGRAM(s) Oral daily  multivitamin 1 Tablet(s) Oral daily  nicotine - 21 mG/24Hr(s) Patch 1 Patch Transdermal daily  pantoprazole  Injectable 40 milliGRAM(s) IV Push every 12 hours  polyethylene glycol 3350 17 Gram(s) Oral every 24 hours  senna 1 Tablet(s) Oral daily    MEDICATIONS  (PRN):  LORazepam   Injectable 2 milliGRAM(s) IV Push once PRN CIWA-Ar score increase by 2 points and a total score of 7 or less

## 2022-07-19 RX ORDER — ASPIRIN/CALCIUM CARB/MAGNESIUM 324 MG
1 TABLET ORAL
Qty: 30 | Refills: 3
Start: 2022-07-19 | End: 2022-11-15

## 2022-07-19 RX ORDER — PANTOPRAZOLE SODIUM 20 MG/1
1 TABLET, DELAYED RELEASE ORAL
Qty: 30 | Refills: 3
Start: 2022-07-19 | End: 2022-11-15

## 2022-07-19 RX ORDER — ATORVASTATIN CALCIUM 80 MG/1
1 TABLET, FILM COATED ORAL
Qty: 30 | Refills: 0
Start: 2022-07-19 | End: 2022-08-17

## 2022-07-19 RX ORDER — ESCITALOPRAM OXALATE 10 MG/1
1 TABLET, FILM COATED ORAL
Qty: 30 | Refills: 3
Start: 2022-07-19 | End: 2022-11-15

## 2022-07-19 RX ORDER — PREGABALIN 225 MG/1
1 CAPSULE ORAL
Qty: 30 | Refills: 0
Start: 2022-07-19 | End: 2022-08-17

## 2022-07-19 RX ORDER — ASPIRIN/CALCIUM CARB/MAGNESIUM 324 MG
1 TABLET ORAL
Qty: 30 | Refills: 0
Start: 2022-07-19 | End: 2022-08-17

## 2022-07-19 RX ORDER — POLYETHYLENE GLYCOL 3350 17 G/17G
17 POWDER, FOR SOLUTION ORAL
Qty: 510 | Refills: 3
Start: 2022-07-19 | End: 2022-11-15

## 2022-07-19 RX ORDER — CLOPIDOGREL BISULFATE 75 MG/1
1 TABLET, FILM COATED ORAL
Qty: 30 | Refills: 3
Start: 2022-07-19 | End: 2022-11-15

## 2022-07-19 RX ORDER — NICOTINE POLACRILEX 2 MG
1 GUM BUCCAL
Qty: 30 | Refills: 0
Start: 2022-07-19 | End: 2022-08-17

## 2022-07-19 RX ORDER — ESCITALOPRAM OXALATE 10 MG/1
1 TABLET, FILM COATED ORAL
Qty: 30 | Refills: 0
Start: 2022-07-19 | End: 2022-08-17

## 2022-07-19 RX ORDER — POLYETHYLENE GLYCOL 3350 17 G/17G
17 POWDER, FOR SOLUTION ORAL
Qty: 510 | Refills: 0
Start: 2022-07-19 | End: 2022-08-17

## 2022-07-19 RX ORDER — ATORVASTATIN CALCIUM 80 MG/1
1 TABLET, FILM COATED ORAL
Qty: 30 | Refills: 3
Start: 2022-07-19 | End: 2022-11-15

## 2022-07-19 RX ORDER — THIAMINE MONONITRATE (VIT B1) 100 MG
1 TABLET ORAL
Qty: 30 | Refills: 0
Start: 2022-07-19 | End: 2022-08-17

## 2022-07-19 RX ORDER — PANTOPRAZOLE SODIUM 20 MG/1
1 TABLET, DELAYED RELEASE ORAL
Qty: 30 | Refills: 0
Start: 2022-07-19 | End: 2022-08-17

## 2022-07-22 DIAGNOSIS — F11.10 OPIOID ABUSE, UNCOMPLICATED: ICD-10-CM

## 2022-07-22 DIAGNOSIS — Z79.02 LONG TERM (CURRENT) USE OF ANTITHROMBOTICS/ANTIPLATELETS: ICD-10-CM

## 2022-07-22 DIAGNOSIS — D50.9 IRON DEFICIENCY ANEMIA, UNSPECIFIED: ICD-10-CM

## 2022-07-22 DIAGNOSIS — F17.210 NICOTINE DEPENDENCE, CIGARETTES, UNCOMPLICATED: ICD-10-CM

## 2022-07-22 DIAGNOSIS — Z59.01 SHELTERED HOMELESSNESS: ICD-10-CM

## 2022-07-22 DIAGNOSIS — Z88.0 ALLERGY STATUS TO PENICILLIN: ICD-10-CM

## 2022-07-22 DIAGNOSIS — Z79.899 OTHER LONG TERM (CURRENT) DRUG THERAPY: ICD-10-CM

## 2022-07-22 DIAGNOSIS — E44.1 MILD PROTEIN-CALORIE MALNUTRITION: ICD-10-CM

## 2022-07-22 DIAGNOSIS — F32.A DEPRESSION, UNSPECIFIED: ICD-10-CM

## 2022-07-22 DIAGNOSIS — F43.10 POST-TRAUMATIC STRESS DISORDER, UNSPECIFIED: ICD-10-CM

## 2022-07-22 DIAGNOSIS — I69.951 HEMIPLEGIA AND HEMIPARESIS FOLLOWING UNSPECIFIED CEREBROVASCULAR DISEASE AFFECTING RIGHT DOMINANT SIDE: ICD-10-CM

## 2022-07-22 DIAGNOSIS — Z79.82 LONG TERM (CURRENT) USE OF ASPIRIN: ICD-10-CM

## 2022-07-22 DIAGNOSIS — Z79.01 LONG TERM (CURRENT) USE OF ANTICOAGULANTS: ICD-10-CM

## 2022-07-22 DIAGNOSIS — D64.9 ANEMIA, UNSPECIFIED: ICD-10-CM

## 2022-07-22 DIAGNOSIS — R55 SYNCOPE AND COLLAPSE: ICD-10-CM

## 2022-07-22 SDOH — ECONOMIC STABILITY - HOUSING INSECURITY: SHELTERED HOMELESSNESS: Z59.01

## 2022-07-25 ENCOUNTER — APPOINTMENT (OUTPATIENT)
Age: 57
End: 2022-07-25

## 2022-08-11 PROCEDURE — 85027 COMPLETE CBC AUTOMATED: CPT

## 2022-08-11 PROCEDURE — 84466 ASSAY OF TRANSFERRIN: CPT

## 2022-08-11 PROCEDURE — 84100 ASSAY OF PHOSPHORUS: CPT

## 2022-08-11 PROCEDURE — 83036 HEMOGLOBIN GLYCOSYLATED A1C: CPT

## 2022-08-11 PROCEDURE — 36430 TRANSFUSION BLD/BLD COMPNT: CPT

## 2022-08-11 PROCEDURE — 82728 ASSAY OF FERRITIN: CPT

## 2022-08-11 PROCEDURE — 84443 ASSAY THYROID STIM HORMONE: CPT

## 2022-08-11 PROCEDURE — 85025 COMPLETE CBC W/AUTO DIFF WBC: CPT

## 2022-08-11 PROCEDURE — 86901 BLOOD TYPING SEROLOGIC RH(D): CPT

## 2022-08-11 PROCEDURE — 86850 RBC ANTIBODY SCREEN: CPT

## 2022-08-11 PROCEDURE — 86900 BLOOD TYPING SEROLOGIC ABO: CPT

## 2022-08-11 PROCEDURE — 83735 ASSAY OF MAGNESIUM: CPT

## 2022-08-11 PROCEDURE — 82746 ASSAY OF FOLIC ACID SERUM: CPT

## 2022-08-11 PROCEDURE — 85045 AUTOMATED RETICULOCYTE COUNT: CPT

## 2022-08-11 PROCEDURE — 83540 ASSAY OF IRON: CPT

## 2022-08-11 PROCEDURE — 86923 COMPATIBILITY TEST ELECTRIC: CPT

## 2022-08-11 PROCEDURE — 86803 HEPATITIS C AB TEST: CPT

## 2022-08-11 PROCEDURE — 82607 VITAMIN B-12: CPT

## 2022-08-11 PROCEDURE — P9016: CPT

## 2022-08-11 PROCEDURE — 80053 COMPREHEN METABOLIC PANEL: CPT

## 2022-08-11 PROCEDURE — 83550 IRON BINDING TEST: CPT

## 2022-08-11 PROCEDURE — 80061 LIPID PANEL: CPT

## 2022-08-11 PROCEDURE — 36415 COLL VENOUS BLD VENIPUNCTURE: CPT

## 2022-08-16 ENCOUNTER — APPOINTMENT (OUTPATIENT)
Age: 57
End: 2022-08-16

## 2022-11-19 NOTE — ED PROVIDER NOTE - SCRIBE NAME
Patient : Chris Chang Age: 27 year old Sex: male   MRN: [id number] Encounter Date: 11/18/2022      History     Chief Complaint   Patient presents with   • Anxiety     HPI  Chris Chang is a 27 year old male with PMH of anxiety who presents today with \"anxiety\".  Patient states he was at home when he started feeling restless and short of breath.  Patient tried taking his hydroxyzine for his symptoms, however it did not help.  Patient denies chest pain, fever, cough, hemoptysis, calf pain/swelling, abdominal pain, nausea, vomiting.  Patient states he did have surgery earlier this week which he was under anesthesia for.  Patient states his shortness of breath has improved somewhat, but he still feels very restless.  Patient is tachycardic at 121. on triage.  Patient states his symptoms do not feel like his typical anxiety, they feel worse.  Patient here for further evaluation.    History was provided by patient.    Allergies   Allergen Reactions   • Eggs Or Egg-Derived Products   (Food Or Med) ANAPHYLAXIS   • Penicillins ANAPHYLAXIS   • Escitalopram ANXIETY   • Codeine Hallucinations       Current Discharge Medication List      Prior to Admission Medications    Details   busPIRone (BUSPAR) 10 MG tablet       hydrOXYzine (ATARAX) 10 MG tablet       Latuda 20 MG tablet Take 20 mg by mouth at bedtime.      Multiple Vitamins-Minerals (One-A-Day Mens, Minerals,) Tab Take 1 tablet by mouth daily.             Past Medical History:   Diagnosis Date   • Anxiety    • Sleep apnea        Past Surgical History:   Procedure Laterality Date   • ORCHIECTOMY         Family History   Problem Relation Age of Onset   • Stroke Mother    • Depression Mother    • Psychiatric Mother    • Hypertension Father    • Asthma Brother    • Diabetes Maternal Aunt    • Diabetes Paternal Aunt    • Cancer Maternal Grandmother    • Cancer Paternal Grandmother        Social History     Tobacco Use   • Smoking status: Never Smoker   • Smokeless tobacco:  Never Used   Vaping Use   • Vaping Use: never used   Substance Use Topics   • Alcohol use: Never   • Drug use: Never       E-cigarette/Vaping   • E-Cigarette/Vaping Use Never Used      E-Cigarette/Vaping Substances & Devices       Review of Systems  See HPI. All other ROS reviewed and negative.    Physical Exam     ED Triage Vitals [11/18/22 2240]   ED Triage Vitals Group      Temp 98.1 °F (36.7 °C)      Heart Rate (!) 121      Resp 18      BP (!) 144/80      SpO2 99 %      EtCO2 mmHg       Height 5' 11\" (1.803 m)      Weight (!) 369 lb 11.2 oz (167.7 kg)      Weight Scale Used Standing scale      BMI (Calculated) 51.56      IBW/kg (Calculated) 75.3       Physical Exam  Vitals and nursing note reviewed.   Constitutional:       Appearance: Normal appearance. He is not toxic-appearing.   HENT:      Head: Atraumatic.      Right Ear: External ear normal.      Left Ear: External ear normal.      Nose: Nose normal.      Mouth/Throat:      Lips: Pink.      Mouth: Mucous membranes are moist.      Pharynx: Uvula midline.   Eyes:      General: Lids are normal.      Conjunctiva/sclera: Conjunctivae normal.   Neck:      Trachea: Trachea and phonation normal.   Cardiovascular:      Rate and Rhythm: Normal rate and regular rhythm.      Heart sounds: Normal heart sounds.   Pulmonary:      Effort: Pulmonary effort is normal.      Breath sounds: Normal breath sounds.   Abdominal:      Palpations: Abdomen is soft.      Tenderness: There is no abdominal tenderness.   Musculoskeletal:         General: Normal range of motion.      Cervical back: Full passive range of motion without pain and neck supple. No rigidity.   Skin:     General: Skin is warm and dry.      Findings: No rash.   Neurological:      Mental Status: He is alert.      GCS: GCS eye subscore is 4. GCS verbal subscore is 5. GCS motor subscore is 6.      Comments: Moves all extremities spontaneously   Psychiatric:         Speech: Speech normal.         Behavior: Behavior  normal. Behavior is cooperative.         ED Course     Procedures    Lab Results     Results for orders placed or performed during the hospital encounter of 11/18/22   D Dimer, Quantitative   Result Value Ref Range    D Dimer, Quantitative 0.66 (H) <0.57 mg/L (FEU)   CBC with Automated Differential (performable only)   Result Value Ref Range    WBC 11.7 (H) 4.2 - 11.0 K/mcL    RBC 5.39 4.50 - 5.90 mil/mcL    HGB 14.7 13.0 - 17.0 g/dL    HCT 45.0 39.0 - 51.0 %    MCV 83.5 78.0 - 100.0 fl    MCH 27.3 26.0 - 34.0 pg    MCHC 32.7 32.0 - 36.5 g/dL    RDW-CV 12.9 11.0 - 15.0 %    RDW-SD 38.5 (L) 39.0 - 50.0 fL     140 - 450 K/mcL    NRBC 0 <=0 /100 WBC    Neutrophil, Percent 60 %    Lymphocytes, Percent 27 %    Mono, Percent 10 %    Eosinophils, Percent 1 %    Basophils, Percent 1 %    Immature Granulocytes 1 %    Absolute Neutrophils 7.2 1.8 - 7.7 K/mcL    Absolute Lymphocytes 3.2 1.0 - 4.8 K/mcL    Absolute Monocytes 1.1 (H) 0.3 - 0.9 K/mcL    Absolute Eosinophils  0.1 0.0 - 0.5 K/mcL    Absolute Basophils 0.1 0.0 - 0.3 K/mcL    Absolute Immmature Granulocytes 0.1 0.0 - 0.2 K/mcL       EKG Results   See chart.  EKG tracing interpreted by ED physician, Dr. Brumfield    Radiology Results     Imaging Results          XR CHEST AP OR PA - PORTABLE (In process)  Result time 11/18/22 23:32:24                ED Medication Orders (From admission, onward)    Ordered Start     Status Ordering Provider    11/18/22 2304 11/18/22 2305  sodium chloride (NORMAL SALINE) 0.9 % bolus 1,000 mL  ONCE         Last MAR action: MARGO Vidales    11/18/22 2304 11/18/22 2305  LORazepam (ATIVAN) injection 1 mg  ONCE         Last MAR action: Given MARGO JEAN  Chris Chang is a 27 year old  male who presents to the Emergency Department with a chief complaint of \"anxiety\". Patient is non-toxic appearing, hemodynamically stable, and afebrile. . EPIC records were reviewed. DDx was considered but not limited to  anxiety, PE, MI, pneumonia, etc.. The patient was provided with ativan.    Patient signed out to Dr. Brumfield at the end of my shift.    The supervising physician for this case was Dr. Brumfield. The patients case and plan of treatment was discussed with Dr. Brumfield, who is in agreement.    Keyla De Leon PA-C  11/18/22  11:54 PM    Clinical Impression     No diagnosis found.    Disposition        There is no disposition no dispo time  There is no comment                     Keyla De Leon PA-C  11/18/22 [id number]     Danika Hurley

## 2022-12-06 ENCOUNTER — EMERGENCY (EMERGENCY)
Facility: HOSPITAL | Age: 57
LOS: 1 days | Discharge: LEFT BEFORE DISCHARGE | End: 2022-12-06
Attending: STUDENT IN AN ORGANIZED HEALTH CARE EDUCATION/TRAINING PROGRAM | Admitting: EMERGENCY MEDICINE
Payer: MEDICAID

## 2022-12-06 VITALS
HEIGHT: 63 IN | DIASTOLIC BLOOD PRESSURE: 73 MMHG | TEMPERATURE: 99 F | HEART RATE: 93 BPM | RESPIRATION RATE: 18 BRPM | WEIGHT: 125 LBS | OXYGEN SATURATION: 96 % | SYSTOLIC BLOOD PRESSURE: 104 MMHG

## 2022-12-06 VITALS
RESPIRATION RATE: 18 BRPM | HEART RATE: 82 BPM | DIASTOLIC BLOOD PRESSURE: 78 MMHG | OXYGEN SATURATION: 95 % | SYSTOLIC BLOOD PRESSURE: 124 MMHG

## 2022-12-06 DIAGNOSIS — U07.1 COVID-19: ICD-10-CM

## 2022-12-06 DIAGNOSIS — Z53.29 PROCEDURE AND TREATMENT NOT CARRIED OUT BECAUSE OF PATIENT'S DECISION FOR OTHER REASONS: ICD-10-CM

## 2022-12-06 DIAGNOSIS — R05.9 COUGH, UNSPECIFIED: ICD-10-CM

## 2022-12-06 DIAGNOSIS — Z79.82 LONG TERM (CURRENT) USE OF ASPIRIN: ICD-10-CM

## 2022-12-06 DIAGNOSIS — Z95.5 PRESENCE OF CORONARY ANGIOPLASTY IMPLANT AND GRAFT: ICD-10-CM

## 2022-12-06 DIAGNOSIS — I69.331 MONOPLEGIA OF UPPER LIMB FOLLOWING CEREBRAL INFARCTION AFFECTING RIGHT DOMINANT SIDE: ICD-10-CM

## 2022-12-06 DIAGNOSIS — Z79.02 LONG TERM (CURRENT) USE OF ANTITHROMBOTICS/ANTIPLATELETS: ICD-10-CM

## 2022-12-06 DIAGNOSIS — E78.5 HYPERLIPIDEMIA, UNSPECIFIED: ICD-10-CM

## 2022-12-06 DIAGNOSIS — F17.200 NICOTINE DEPENDENCE, UNSPECIFIED, UNCOMPLICATED: ICD-10-CM

## 2022-12-06 DIAGNOSIS — I25.10 ATHEROSCLEROTIC HEART DISEASE OF NATIVE CORONARY ARTERY WITHOUT ANGINA PECTORIS: ICD-10-CM

## 2022-12-06 DIAGNOSIS — Z59.01 SHELTERED HOMELESSNESS: ICD-10-CM

## 2022-12-06 DIAGNOSIS — Z88.0 ALLERGY STATUS TO PENICILLIN: ICD-10-CM

## 2022-12-06 LAB
FLUAV AG NPH QL: SIGNIFICANT CHANGE UP
FLUBV AG NPH QL: SIGNIFICANT CHANGE UP
RSV RNA NPH QL NAA+NON-PROBE: SIGNIFICANT CHANGE UP
SARS-COV-2 RNA SPEC QL NAA+PROBE: DETECTED

## 2022-12-06 PROCEDURE — 99284 EMERGENCY DEPT VISIT MOD MDM: CPT

## 2022-12-06 PROCEDURE — 87637 SARSCOV2&INF A&B&RSV AMP PRB: CPT

## 2022-12-06 PROCEDURE — 99283 EMERGENCY DEPT VISIT LOW MDM: CPT

## 2022-12-06 SDOH — ECONOMIC STABILITY - HOUSING INSECURITY: SHELTERED HOMELESSNESS: Z59.01

## 2022-12-06 NOTE — ED PROVIDER NOTE - NSFOLLOWUPINSTRUCTIONS_ED_ALL_ED_FT
COVID-19      COVID-19, or coronavirus disease 2019, is a systemic infection that is caused by a novel coronavirus called SARS-CoV-2. In some people, the virus may not cause any symptoms. In others, it may cause mild or severe symptoms. Some people with severe infection develop severe disease, which may lead to acute respiratory distress syndrome and shock.      What are the causes?  The human body, showing how the coronavirus travels from the air to a person's lungs.   This illness is caused by a virus. The virus may be in the air or on surfaces as droplets or aerosols of various sizes. You may catch the virus by:  •Breathing in droplets from an infected person. Droplets can be spread by a person breathing, speaking, singing, coughing, or sneezing.      •Touching something, like a table or a doorknob, that was exposed to the virus (is contaminated) and then touching your mouth, nose, or eyes.        What increases the risk?    Risk for infection:     You are more likely to become infected with the COVID-19 virus if:  •You are within 6 ft (1.8 m) of a person with COVID-19 for 15 minutes or longer.      •You are providing care for a person who is infected with COVID-19.      •You are in close personal contact with other people. Close personal contact includes hugging, kissing, or sharing eating or drinking utensils.      Risk for serious illness due to COVID-19:    You are more likely to become seriously ill from the COVID-19 virus if:  •You have cancer.    •You have a long-term (chronic) disease, such as:  •Chronic lung disease, including pulmonary embolism, chronic obstructive pulmonary disease, and cystic fibrosis.      •Long-term disease that lowers your body's ability to fight infection (immunocompromise).      •Serious cardiac conditions, such as heart failure, coronary artery disease, or cardiomyopathy.      •Diabetes.      •Chronic kidney disease.      •Liver diseases, including cirrhosis, nonalcoholic fatty liver disease, alcoholic liver disease, or autoimmune hepatitis.        •You are obese.      •You are pregnant or recently pregnant.      •You have sickle cell disease.        What are the signs or symptoms?    Symptoms of this condition can range from mild to severe. Symptoms may appear any time from 2 to 14 days after being exposed to the virus. They include:  •Fever or chills.      •Difficulty breathing or having shortness of breath.      •Feeling tired or very tired.      •Headaches, body aches, or muscle aches.      •Runny or stuffy nose, sneezing, cough, sore throat.      •New loss of taste or smell. This is rare.      Some people may also have stomach problems, such as nausea, vomiting, or diarrhea.    Other people may not have any symptoms of COVID-19.      How is this diagnosed?  A sample being collected by swabbing the nose.   This condition may be diagnosed by testing samples to check for the COVID-19 virus. The most common tests are the PCR test and the antigen test. Tests may be done in the lab or at home. They include:  •Using a swab to take a sample of fluid from the back of your nose and throat (nasopharyngeal fluid), from your nose, or from your throat.      •Testing a sample of saliva from your mouth.      •Testing a sample of coughed-up mucus from your lungs (sputum).        How is this treated?    Treatment for COVID-19 infection depends on the severity of the condition.  •Mild symptoms can be managed at home with rest, fluids, and over-the-counter medicines.    •Serious symptoms may be treated in a hospital intensive care unit, or ICU. Treatment in the ICU may include:  •Supplemental oxygen. Extra oxygen is given through a tube in the nose, a face mask, or a sen.    •Medicines. You may be given medicines:  •To help your body fight off certain viruses that can cause disease (antivirals).      •To reduce inflammation and suppress the immune system (corticosteroids).      •To prevent or treat blood clots, if they develop (antithrombotics).        •Antibodies made in a lab that can restore or strengthen your body's natural immune system (monoclonal antibody).      •Positioning you to lie on your stomach (prone position). This makes it easier for oxygen to get into the lungs.      •Infection control measures.        If you are at risk for more serious illness due to COVID-19, your health care provider may prescribe a combination of two long-acting monoclonal antibodies, administered together every 6 months.      How is this prevented?    To protect yourself:   •Get vaccinated. COVID-19 vaccines are available for everyone aged 6 months and older.  •Vaccination is recommended for women who are pregnant, may become pregnant, or are breastfeeding.      •Patients who require major surgery should plan their vaccination for several days before or after the surgery.      •Talk to your health care provider about receiving experimental monoclonal antibodies. This treatment has been approved under emergency use authorization to prevent severe illness before or after you are exposed to the COVID-19 virus. You may be given monoclonal antibodies if:  •You are moderately or severely immunocompromised. This includes treatments that lower your immune response. People with immunocompromise may not develop protection against COVID-19 when they are vaccinated.      •You cannot be vaccinated. You may not receive a vaccine if you have a severe allergic reaction to the vaccine or its components.      •You are not fully vaccinated.      •You are in close contact with a person who is infected with SARS-CoV-2, or at high risk of exposure to SARS-CoV-2, in an institution in which COVID-19 is occurring.      •You are at risk of illness from new variants of the COVID-19 virus.        To protect others:     If you have symptoms of COVID-19, take steps to prevent the virus from spreading to others.  •Stay home. Leave your house only to seek medical care. Do not use public transport, if possible.      •Do not travel while you are sick.      •Wash your hands often with soap and water for at least 20 seconds. If soap and water are not available, use alcohol-based hand .      •Stay away from other members of your household. Let healthy household members care for children and pets, if possible. If you have to care for children or pets, wash your hands often and wear a mask. If possible, stay in your own room, separate from others. Use a different bathroom.      •Make sure that all people in your household wash their hands well and often.      •Cough or sneeze into a tissue or your sleeve or elbow. Do not cough or sneeze into your hand or into the air.        Where to find more information    •Centers for Disease Control and Prevention: www.cdc.gov/coronavirus      •World Health Organization: www.who.int/health-topics/coronavirus        Get help right away if:    •You have trouble breathing.      •You have pain or pressure in your chest.      •You have confusion.      •You have bluish lips and fingernails.      •You have difficulty waking from sleep.      •You have symptoms that get worse.      These symptoms may be an emergency. Get help right away. Call 911.   • Do not wait to see if the symptoms will go away.        •  Do not drive yourself to the hospital.         Summary    •COVID-19 is a respiratory infection that is caused by a virus.      •Some people with a severe COVID-19 infection develop severe disease, which may lead to acute respiratory distress syndrome and shock.      •The virus that causes COVID-19 is contagious. This means that it can spread from person to person through droplets from breathing, speaking, singing, coughing, or sneezing.      •Mild symptoms of COVID-19 can be managed at home with rest, fluids, and over-the-counter medicines.      This information is not intended to replace advice given to you by your health care provider. Make sure you discuss any questions you have with your health care provider.      Document Revised: 09/09/2022 Document Reviewed: 09/09/2022    [employer] Patient Education © 2022 [employer]

## 2022-12-06 NOTE — ED PROVIDER NOTE - PHYSICAL EXAMINATION
CONST: nontoxic NAD speaking in full sentences  HEAD: atraumatic  EYES: conjunctivae clear, PERRL, EOMI  ENT: poor dentition  NECK: supple/FROM  CARD: rrr   CHEST: no stridor/retractions/tripoding  ABD: soft, nd, nttp, no rebound/guarding  EXT: FROM, symmetric distal pulses intact  SKIN: warm, dry, no rash, no ttp/rash, cap refill <2sec  NEURO: a+ox3, moving all extremities spontaneously

## 2022-12-06 NOTE — ED PROVIDER NOTE - PATIENT PORTAL LINK FT
You can access the FollowMyHealth Patient Portal offered by [facility] by registering at the following website: [email or web address]. By joining "[employer]’s FollowMyHealth portal, you will also be able to view your health information using other applications (apps) compatible with our system. You can access the FollowMyHealth Patient Portal offered by [facility] by registering at the following website: [email or web address]. By joining [employer]’s FollowMyHealth portal, you will also be able to view your health information using other applications (apps) compatible with our system.

## 2022-12-06 NOTE — ED PROVIDER NOTE - CLINICAL SUMMARY MEDICAL DECISION MAKING FREE TEXT BOX
Appears fine, not ill appearing  COVID+ here, normal vitals normal respiratory effort no distress  pt says he lives in a shelter and doesn't want to go back, wants resources for other housing options Appears fine, not ill appearing  COVID+ here, normal vitals normal respiratory effort no distress  pt says he lives in a shelter and doesn't want to go back, wants to speak to

## 2022-12-06 NOTE — ED ADULT NURSE NOTE - OBJECTIVE STATEMENT
Patient w/ PMHx polysubstance abuse, depression; c/o coughing, feeling weak, chill, intermittent dizziness started 2 days ago, also stated Right arm numbness started yesterday 11am. Patient stated had CAD s/p cardiac stent x 2, PAD B/L legs stent, history of stroke w/ Rt side residual weakness, ambulate w/ walker. Patient visited another ED earlier today. On Plavix, ASA, Lexapro. Patient denies any street drug use at this time. Patient stated had 2 shots of EtOH yesterday. Currently live in shelter "i'm trying to get into assisted-living. Current daily smoker. Patient denies chest pain, n/v/d, abd pain, bladder/bowel dysfunction, change in vision. Strong B/L , no strength discrepancy noted on L/R sides.

## 2022-12-06 NOTE — ED ADULT NURSE NOTE - NSIMPLEMENTINTERV_GEN_ALL_ED
Implemented All Fall with Harm Risk Interventions:  [place] to call system. Call bell, personal items and telephone within reach. Instruct patient to call for assistance. Room bathroom lighting operational. Non-slip footwear when patient is off stretcher. Physically safe environment: no spills, clutter or unnecessary equipment. Stretcher in lowest position, wheels locked, appropriate side rails in place. Provide visual cue, wrist band, yellow gown, etc. Monitor gait and stability. Monitor for mental status changes and reorient to person, place, and time. Review medications for side effects contributing to fall risk. Reinforce activity limits and safety measures with patient and family. Provide visual clues: red socks.

## 2022-12-06 NOTE — ED ADULT TRIAGE NOTE - CHIEF COMPLAINT QUOTE
I think I have covid- with cough , feeling weak and fever since 2 days ago; also with   right arm numbness since yesterday

## 2022-12-06 NOTE — ED PROVIDER NOTE - OBJECTIVE STATEMENT
57yM w/ CAD stents, PAD stents in b/l LE, CVA w/ residual RUE weakness  lives in a shelter and has 2 days of cough, fatigue, weakness, thinks he may have COVID and requesting testing  No CP or SOB 57yM w/ CAD stents, PAD stents in b/l LE, HLD, CVA w/ residual RUE weakness  lives in a shelter and has 2 days of cough, fatigue, weakness, thinks he may have COVID and requesting testing  No CP or SOB

## 2023-07-14 NOTE — ED PROVIDER NOTE - IV ALTEPLASE DOOR HIDDEN
Consent: The risks of pain and injection site reactions were reviewed with the patient prior to the injection. show

## 2023-10-31 NOTE — ED ADULT NURSE NOTE - SUICIDE SCREENING QUESTION 1
For information on Fall & Injury Prevention, visit: [email or web address] OR  [email or web address] OR  https://www.cdc.gov/steadi/patient.html No

## 2025-08-01 ENCOUNTER — EMERGENCY (EMERGENCY)
Facility: HOSPITAL | Age: 60
LOS: 1 days | End: 2025-08-01
Attending: EMERGENCY MEDICINE | Admitting: EMERGENCY MEDICINE
Payer: MEDICAID

## 2025-08-01 VITALS
WEIGHT: 110.01 LBS | DIASTOLIC BLOOD PRESSURE: 96 MMHG | HEART RATE: 99 BPM | SYSTOLIC BLOOD PRESSURE: 141 MMHG | HEIGHT: 60 IN | TEMPERATURE: 97 F | OXYGEN SATURATION: 96 % | RESPIRATION RATE: 16 BRPM

## 2025-08-01 DIAGNOSIS — I69.951 HEMIPLEGIA AND HEMIPARESIS FOLLOWING UNSPECIFIED CEREBROVASCULAR DISEASE AFFECTING RIGHT DOMINANT SIDE: ICD-10-CM

## 2025-08-01 DIAGNOSIS — Z53.29 PROCEDURE AND TREATMENT NOT CARRIED OUT BECAUSE OF PATIENT'S DECISION FOR OTHER REASONS: ICD-10-CM

## 2025-08-01 DIAGNOSIS — Z95.5 PRESENCE OF CORONARY ANGIOPLASTY IMPLANT AND GRAFT: Chronic | ICD-10-CM

## 2025-08-01 DIAGNOSIS — R53.83 OTHER FATIGUE: ICD-10-CM

## 2025-08-01 DIAGNOSIS — F17.200 NICOTINE DEPENDENCE, UNSPECIFIED, UNCOMPLICATED: ICD-10-CM

## 2025-08-01 DIAGNOSIS — Z59.01 SHELTERED HOMELESSNESS: ICD-10-CM

## 2025-08-01 DIAGNOSIS — Z88.0 ALLERGY STATUS TO PENICILLIN: ICD-10-CM

## 2025-08-01 PROCEDURE — 99282 EMERGENCY DEPT VISIT SF MDM: CPT

## 2025-08-01 PROCEDURE — 99284 EMERGENCY DEPT VISIT MOD MDM: CPT | Mod: 25

## 2025-08-01 RX ORDER — GABAPENTIN 400 MG/1
400 CAPSULE ORAL ONCE
Refills: 0 | Status: COMPLETED | OUTPATIENT
Start: 2025-08-01 | End: 2025-08-01

## 2025-08-01 SDOH — ECONOMIC STABILITY - HOUSING INSECURITY: SHELTERED HOMELESSNESS: Z59.01

## 2025-08-07 ENCOUNTER — EMERGENCY (EMERGENCY)
Facility: HOSPITAL | Age: 60
LOS: 1 days | End: 2025-08-07
Admitting: EMERGENCY MEDICINE
Payer: MEDICAID

## 2025-08-07 VITALS
TEMPERATURE: 98 F | WEIGHT: 108.03 LBS | RESPIRATION RATE: 18 BRPM | HEART RATE: 114 BPM | OXYGEN SATURATION: 97 % | HEIGHT: 60 IN | SYSTOLIC BLOOD PRESSURE: 157 MMHG | DIASTOLIC BLOOD PRESSURE: 112 MMHG

## 2025-08-07 DIAGNOSIS — Z95.5 PRESENCE OF CORONARY ANGIOPLASTY IMPLANT AND GRAFT: Chronic | ICD-10-CM

## 2025-08-07 DIAGNOSIS — Z88.0 ALLERGY STATUS TO PENICILLIN: ICD-10-CM

## 2025-08-07 DIAGNOSIS — S21.109D UNSPECIFIED OPEN WOUND OF UNSPECIFIED FRONT WALL OF THORAX WITHOUT PENETRATION INTO THORACIC CAVITY, SUBSEQUENT ENCOUNTER: ICD-10-CM

## 2025-08-07 DIAGNOSIS — F17.200 NICOTINE DEPENDENCE, UNSPECIFIED, UNCOMPLICATED: ICD-10-CM

## 2025-08-07 DIAGNOSIS — F32.A DEPRESSION, UNSPECIFIED: ICD-10-CM

## 2025-08-07 DIAGNOSIS — Z86.73 PERSONAL HISTORY OF TRANSIENT ISCHEMIC ATTACK (TIA), AND CEREBRAL INFARCTION WITHOUT RESIDUAL DEFICITS: ICD-10-CM

## 2025-08-07 PROBLEM — I25.10 ATHEROSCLEROTIC HEART DISEASE OF NATIVE CORONARY ARTERY WITHOUT ANGINA PECTORIS: Chronic | Status: ACTIVE | Noted: 2025-08-01

## 2025-08-07 PROBLEM — I63.9 CEREBRAL INFARCTION, UNSPECIFIED: Chronic | Status: ACTIVE | Noted: 2025-08-01

## 2025-08-07 PROBLEM — E78.00 PURE HYPERCHOLESTEROLEMIA, UNSPECIFIED: Chronic | Status: ACTIVE | Noted: 2025-08-01

## 2025-08-07 PROCEDURE — 99282 EMERGENCY DEPT VISIT SF MDM: CPT

## 2025-08-07 PROCEDURE — 99283 EMERGENCY DEPT VISIT LOW MDM: CPT | Mod: 25

## 2025-08-12 ENCOUNTER — EMERGENCY (EMERGENCY)
Facility: HOSPITAL | Age: 60
LOS: 1 days | End: 2025-08-12
Attending: EMERGENCY MEDICINE | Admitting: EMERGENCY MEDICINE
Payer: MEDICAID

## 2025-08-12 VITALS
RESPIRATION RATE: 15 BRPM | TEMPERATURE: 98 F | OXYGEN SATURATION: 97 % | HEIGHT: 60 IN | HEART RATE: 79 BPM | DIASTOLIC BLOOD PRESSURE: 74 MMHG | SYSTOLIC BLOOD PRESSURE: 126 MMHG | WEIGHT: 130.07 LBS

## 2025-08-12 DIAGNOSIS — Z95.5 PRESENCE OF CORONARY ANGIOPLASTY IMPLANT AND GRAFT: Chronic | ICD-10-CM

## 2025-08-12 PROCEDURE — 99283 EMERGENCY DEPT VISIT LOW MDM: CPT

## 2025-08-12 RX ORDER — MUPIROCIN CALCIUM 20 MG/G
1 CREAM TOPICAL ONCE
Refills: 0 | Status: COMPLETED | OUTPATIENT
Start: 2025-08-12 | End: 2025-08-12

## 2025-08-12 RX ORDER — ALBUTEROL SULFATE 2.5 MG/3ML
2 VIAL, NEBULIZER (ML) INHALATION ONCE
Refills: 0 | Status: COMPLETED | OUTPATIENT
Start: 2025-08-12 | End: 2025-08-12

## 2025-08-12 RX ADMIN — MUPIROCIN CALCIUM 1 APPLICATION(S): 20 CREAM TOPICAL at 13:07

## 2025-08-12 RX ADMIN — Medication 2 PUFF(S): at 13:49

## 2025-08-15 DIAGNOSIS — F32.A DEPRESSION, UNSPECIFIED: ICD-10-CM

## 2025-08-15 DIAGNOSIS — Z86.73 PERSONAL HISTORY OF TRANSIENT ISCHEMIC ATTACK (TIA), AND CEREBRAL INFARCTION WITHOUT RESIDUAL DEFICITS: ICD-10-CM

## 2025-08-15 DIAGNOSIS — F17.200 NICOTINE DEPENDENCE, UNSPECIFIED, UNCOMPLICATED: ICD-10-CM

## 2025-08-15 DIAGNOSIS — S21.109D UNSPECIFIED OPEN WOUND OF UNSPECIFIED FRONT WALL OF THORAX WITHOUT PENETRATION INTO THORACIC CAVITY, SUBSEQUENT ENCOUNTER: ICD-10-CM

## 2025-08-15 DIAGNOSIS — F19.10 OTHER PSYCHOACTIVE SUBSTANCE ABUSE, UNCOMPLICATED: ICD-10-CM

## 2025-08-15 DIAGNOSIS — Z88.0 ALLERGY STATUS TO PENICILLIN: ICD-10-CM

## 2025-08-18 ENCOUNTER — EMERGENCY (EMERGENCY)
Facility: HOSPITAL | Age: 60
LOS: 1 days | End: 2025-08-18
Attending: EMERGENCY MEDICINE | Admitting: EMERGENCY MEDICINE
Payer: MEDICAID

## 2025-08-18 VITALS
WEIGHT: 110.01 LBS | SYSTOLIC BLOOD PRESSURE: 120 MMHG | DIASTOLIC BLOOD PRESSURE: 80 MMHG | TEMPERATURE: 98 F | RESPIRATION RATE: 16 BRPM | OXYGEN SATURATION: 96 % | HEIGHT: 60 IN | HEART RATE: 90 BPM

## 2025-08-18 DIAGNOSIS — Z88.0 ALLERGY STATUS TO PENICILLIN: ICD-10-CM

## 2025-08-18 DIAGNOSIS — R52 PAIN, UNSPECIFIED: ICD-10-CM

## 2025-08-18 DIAGNOSIS — R45.1 RESTLESSNESS AND AGITATION: ICD-10-CM

## 2025-08-18 DIAGNOSIS — Z76.5 MALINGERER [CONSCIOUS SIMULATION]: ICD-10-CM

## 2025-08-18 DIAGNOSIS — Z59.00 HOMELESSNESS UNSPECIFIED: ICD-10-CM

## 2025-08-18 DIAGNOSIS — R19.7 DIARRHEA, UNSPECIFIED: ICD-10-CM

## 2025-08-18 DIAGNOSIS — R06.2 WHEEZING: ICD-10-CM

## 2025-08-18 DIAGNOSIS — F32.A DEPRESSION, UNSPECIFIED: ICD-10-CM

## 2025-08-18 DIAGNOSIS — F17.210 NICOTINE DEPENDENCE, CIGARETTES, UNCOMPLICATED: ICD-10-CM

## 2025-08-18 DIAGNOSIS — Z95.5 PRESENCE OF CORONARY ANGIOPLASTY IMPLANT AND GRAFT: Chronic | ICD-10-CM

## 2025-08-18 PROCEDURE — 99284 EMERGENCY DEPT VISIT MOD MDM: CPT | Mod: 25

## 2025-08-18 SDOH — ECONOMIC STABILITY - HOUSING INSECURITY: HOMELESSNESS UNSPECIFIED: Z59.00

## 2025-08-19 PROCEDURE — 99283 EMERGENCY DEPT VISIT LOW MDM: CPT | Mod: 25

## 2025-08-19 PROCEDURE — 93010 ELECTROCARDIOGRAM REPORT: CPT

## 2025-08-19 PROCEDURE — 93005 ELECTROCARDIOGRAM TRACING: CPT
